# Patient Record
Sex: FEMALE | Race: WHITE | NOT HISPANIC OR LATINO | Employment: FULL TIME | URBAN - METROPOLITAN AREA
[De-identification: names, ages, dates, MRNs, and addresses within clinical notes are randomized per-mention and may not be internally consistent; named-entity substitution may affect disease eponyms.]

---

## 2017-01-05 ENCOUNTER — COMMUNICATION - HEALTHEAST (OUTPATIENT)
Dept: INTERNAL MEDICINE | Facility: CLINIC | Age: 52
End: 2017-01-05

## 2017-01-05 DIAGNOSIS — I10 HYPERTENSION: ICD-10-CM

## 2017-06-16 ENCOUNTER — COMMUNICATION - HEALTHEAST (OUTPATIENT)
Dept: INTERNAL MEDICINE | Facility: CLINIC | Age: 52
End: 2017-06-16

## 2017-06-16 DIAGNOSIS — E03.9 HYPOTHYROIDISM: ICD-10-CM

## 2017-06-16 DIAGNOSIS — I10 HYPERTENSION: ICD-10-CM

## 2017-07-12 ENCOUNTER — RECORDS - HEALTHEAST (OUTPATIENT)
Dept: MAMMOGRAPHY | Facility: CLINIC | Age: 52
End: 2017-07-12

## 2017-07-12 ENCOUNTER — AMBULATORY - HEALTHEAST (OUTPATIENT)
Dept: INTERNAL MEDICINE | Facility: CLINIC | Age: 52
End: 2017-07-12

## 2017-07-12 ENCOUNTER — OFFICE VISIT - HEALTHEAST (OUTPATIENT)
Dept: INTERNAL MEDICINE | Facility: CLINIC | Age: 52
End: 2017-07-12

## 2017-07-12 DIAGNOSIS — E03.9 ACQUIRED HYPOTHYROIDISM: ICD-10-CM

## 2017-07-12 DIAGNOSIS — G47.00 INSOMNIA: ICD-10-CM

## 2017-07-12 DIAGNOSIS — Z51.81 MEDICATION MONITORING ENCOUNTER: ICD-10-CM

## 2017-07-12 DIAGNOSIS — E11.9 TYPE 2 DIABETES MELLITUS (H): ICD-10-CM

## 2017-07-12 DIAGNOSIS — Z00.00 PREVENTATIVE HEALTH CARE: ICD-10-CM

## 2017-07-12 DIAGNOSIS — Z12.31 ENCOUNTER FOR SCREENING MAMMOGRAM FOR MALIGNANT NEOPLASM OF BREAST: ICD-10-CM

## 2017-07-12 LAB
CHOLEST SERPL-MCNC: 190 MG/DL
FASTING STATUS PATIENT QL REPORTED: YES
HBA1C MFR BLD: 6.8 % (ref 3.5–6)
HDLC SERPL-MCNC: 44 MG/DL
LDLC SERPL CALC-MCNC: 119 MG/DL
TRIGL SERPL-MCNC: 134 MG/DL

## 2017-07-12 RX ORDER — PYRIDOXINE HCL (VITAMIN B6) 100 MG
TABLET ORAL DAILY
Status: SHIPPED | COMMUNITY
Start: 2017-07-12

## 2017-07-12 ASSESSMENT — MIFFLIN-ST. JEOR: SCORE: 1882.39

## 2017-08-01 ENCOUNTER — RECORDS - HEALTHEAST (OUTPATIENT)
Dept: ADMINISTRATIVE | Facility: OTHER | Age: 52
End: 2017-08-01

## 2017-09-14 ENCOUNTER — COMMUNICATION - HEALTHEAST (OUTPATIENT)
Dept: INTERNAL MEDICINE | Facility: CLINIC | Age: 52
End: 2017-09-14

## 2017-09-14 DIAGNOSIS — E11.9 TYPE 2 DIABETES MELLITUS (H): ICD-10-CM

## 2017-09-20 ENCOUNTER — OFFICE VISIT - HEALTHEAST (OUTPATIENT)
Dept: INTERNAL MEDICINE | Facility: CLINIC | Age: 52
End: 2017-09-20

## 2017-09-20 DIAGNOSIS — Z00.00 PREVENTATIVE HEALTH CARE: ICD-10-CM

## 2017-09-20 DIAGNOSIS — E83.52 HYPERCALCEMIA: ICD-10-CM

## 2017-09-20 DIAGNOSIS — E03.9 ACQUIRED HYPOTHYROIDISM: ICD-10-CM

## 2017-09-20 DIAGNOSIS — E11.9 TYPE 2 DIABETES MELLITUS (H): ICD-10-CM

## 2017-09-20 RX ORDER — ALBUTEROL SULFATE 90 UG/1
2 AEROSOL, METERED RESPIRATORY (INHALATION) EVERY 6 HOURS PRN
Qty: 1 INHALER | Refills: 3 | Status: SHIPPED | OUTPATIENT
Start: 2017-09-20 | End: 2023-04-04

## 2017-09-20 ASSESSMENT — MIFFLIN-ST. JEOR: SCORE: 1905.07

## 2017-09-25 LAB
HPV INTERPRETATION - HISTORICAL: NORMAL
HPV INTERPRETER - HISTORICAL: NORMAL

## 2017-10-02 ENCOUNTER — COMMUNICATION - HEALTHEAST (OUTPATIENT)
Dept: INTERNAL MEDICINE | Facility: CLINIC | Age: 52
End: 2017-10-02

## 2017-10-02 LAB
BKR LAB AP ABNORMAL BLEEDING: NO
BKR LAB AP BIRTH CONTROL/HORMONES: NORMAL
BKR LAB AP CERVICAL APPEARANCE: NORMAL
BKR LAB AP GYN ADEQUACY: NORMAL
BKR LAB AP GYN INTERPRETATION: NORMAL
BKR LAB AP GYN OTHER FINDINGS: NORMAL
BKR LAB AP HPV REFLEX: NORMAL
BKR LAB AP LMP: NORMAL
BKR LAB AP PATIENT STATUS: NO
BKR LAB AP PREVIOUS ABNORMAL: NORMAL
BKR LAB AP PREVIOUS NORMAL: 2014
HIGH RISK?: NO
PATH REPORT.COMMENTS IMP SPEC: NORMAL
RESULT FLAG (HE HISTORICAL CONVERSION): NORMAL

## 2017-10-03 ENCOUNTER — COMMUNICATION - HEALTHEAST (OUTPATIENT)
Dept: INTERNAL MEDICINE | Facility: CLINIC | Age: 52
End: 2017-10-03

## 2017-10-06 ENCOUNTER — COMMUNICATION - HEALTHEAST (OUTPATIENT)
Dept: INTERNAL MEDICINE | Facility: CLINIC | Age: 52
End: 2017-10-06

## 2018-02-23 ENCOUNTER — COMMUNICATION - HEALTHEAST (OUTPATIENT)
Dept: INTERNAL MEDICINE | Facility: CLINIC | Age: 53
End: 2018-02-23

## 2018-02-23 DIAGNOSIS — E11.9 DIABETES (H): ICD-10-CM

## 2018-02-28 ENCOUNTER — COMMUNICATION - HEALTHEAST (OUTPATIENT)
Dept: INTERNAL MEDICINE | Facility: CLINIC | Age: 53
End: 2018-02-28

## 2018-02-28 DIAGNOSIS — E03.9 HYPOTHYROIDISM: ICD-10-CM

## 2018-02-28 DIAGNOSIS — E11.9 TYPE 2 DIABETES MELLITUS (H): ICD-10-CM

## 2018-05-08 ENCOUNTER — COMMUNICATION - HEALTHEAST (OUTPATIENT)
Dept: INTERNAL MEDICINE | Facility: CLINIC | Age: 53
End: 2018-05-08

## 2018-05-08 DIAGNOSIS — E11.9 DIABETES (H): ICD-10-CM

## 2018-05-29 ENCOUNTER — COMMUNICATION - HEALTHEAST (OUTPATIENT)
Dept: INTERNAL MEDICINE | Facility: CLINIC | Age: 53
End: 2018-05-29

## 2018-05-29 DIAGNOSIS — E11.9 TYPE 2 DIABETES MELLITUS (H): ICD-10-CM

## 2018-05-29 DIAGNOSIS — E03.9 HYPOTHYROIDISM: ICD-10-CM

## 2018-06-11 ENCOUNTER — COMMUNICATION - HEALTHEAST (OUTPATIENT)
Dept: INTERNAL MEDICINE | Facility: CLINIC | Age: 53
End: 2018-06-11

## 2018-06-11 DIAGNOSIS — I10 HYPERTENSION: ICD-10-CM

## 2018-06-11 DIAGNOSIS — G47.00 INSOMNIA: ICD-10-CM

## 2018-08-08 ENCOUNTER — COMMUNICATION - HEALTHEAST (OUTPATIENT)
Dept: INTERNAL MEDICINE | Facility: CLINIC | Age: 53
End: 2018-08-08

## 2018-08-08 DIAGNOSIS — E11.9 DIABETES (H): ICD-10-CM

## 2018-08-26 ENCOUNTER — COMMUNICATION - HEALTHEAST (OUTPATIENT)
Dept: INTERNAL MEDICINE | Facility: CLINIC | Age: 53
End: 2018-08-26

## 2018-08-26 DIAGNOSIS — E11.9 TYPE 2 DIABETES MELLITUS (H): ICD-10-CM

## 2018-08-26 DIAGNOSIS — E03.9 HYPOTHYROIDISM: ICD-10-CM

## 2018-08-28 ENCOUNTER — COMMUNICATION - HEALTHEAST (OUTPATIENT)
Dept: INTERNAL MEDICINE | Facility: CLINIC | Age: 53
End: 2018-08-28

## 2018-08-28 ENCOUNTER — OFFICE VISIT - HEALTHEAST (OUTPATIENT)
Dept: INTERNAL MEDICINE | Facility: CLINIC | Age: 53
End: 2018-08-28

## 2018-08-28 DIAGNOSIS — Z12.31 VISIT FOR SCREENING MAMMOGRAM: ICD-10-CM

## 2018-08-28 DIAGNOSIS — G47.00 INSOMNIA: ICD-10-CM

## 2018-08-28 DIAGNOSIS — E11.9 TYPE 2 DIABETES MELLITUS (H): ICD-10-CM

## 2018-08-28 DIAGNOSIS — E03.9 ACQUIRED HYPOTHYROIDISM: ICD-10-CM

## 2018-08-28 LAB
ALBUMIN SERPL-MCNC: 3.9 G/DL (ref 3.5–5)
ALP SERPL-CCNC: 83 U/L (ref 45–120)
ALT SERPL W P-5'-P-CCNC: 13 U/L (ref 0–45)
ANION GAP SERPL CALCULATED.3IONS-SCNC: 9 MMOL/L (ref 5–18)
AST SERPL W P-5'-P-CCNC: 15 U/L (ref 0–40)
BILIRUB SERPL-MCNC: 0.8 MG/DL (ref 0–1)
BUN SERPL-MCNC: 26 MG/DL (ref 8–22)
CALCIUM SERPL-MCNC: 10.5 MG/DL (ref 8.5–10.5)
CHLORIDE BLD-SCNC: 102 MMOL/L (ref 98–107)
CHOLEST SERPL-MCNC: 217 MG/DL
CO2 SERPL-SCNC: 29 MMOL/L (ref 22–31)
CREAT SERPL-MCNC: 1.26 MG/DL (ref 0.6–1.1)
ERYTHROCYTE [DISTWIDTH] IN BLOOD BY AUTOMATED COUNT: 13.1 % (ref 11–14.5)
FASTING STATUS PATIENT QL REPORTED: YES
GFR SERPL CREATININE-BSD FRML MDRD: 44 ML/MIN/1.73M2
GLUCOSE BLD-MCNC: 123 MG/DL (ref 70–125)
HBA1C MFR BLD: 7.5 % (ref 3.5–6)
HCT VFR BLD AUTO: 38.7 % (ref 35–47)
HDLC SERPL-MCNC: 45 MG/DL
HGB BLD-MCNC: 12.9 G/DL (ref 12–16)
LDLC SERPL CALC-MCNC: 139 MG/DL
MCH RBC QN AUTO: 28.9 PG (ref 27–34)
MCHC RBC AUTO-ENTMCNC: 33.3 G/DL (ref 32–36)
MCV RBC AUTO: 87 FL (ref 80–100)
PLATELET # BLD AUTO: 259 THOU/UL (ref 140–440)
PMV BLD AUTO: 8.5 FL (ref 7–10)
POTASSIUM BLD-SCNC: 4.8 MMOL/L (ref 3.5–5)
PROT SERPL-MCNC: 7.2 G/DL (ref 6–8)
PTH-INTACT SERPL-MCNC: 71 PG/ML (ref 10–86)
RBC # BLD AUTO: 4.46 MILL/UL (ref 3.8–5.4)
SODIUM SERPL-SCNC: 140 MMOL/L (ref 136–145)
T4 FREE SERPL-MCNC: 1.5 NG/DL (ref 0.7–1.8)
TRIGL SERPL-MCNC: 164 MG/DL
TSH SERPL DL<=0.005 MIU/L-ACNC: 6.05 UIU/ML (ref 0.3–5)
WBC: 8.1 THOU/UL (ref 4–11)

## 2018-09-09 ENCOUNTER — COMMUNICATION - HEALTHEAST (OUTPATIENT)
Dept: INTERNAL MEDICINE | Facility: CLINIC | Age: 53
End: 2018-09-09

## 2018-09-09 DIAGNOSIS — I10 HYPERTENSION: ICD-10-CM

## 2018-09-11 ENCOUNTER — RECORDS - HEALTHEAST (OUTPATIENT)
Dept: MAMMOGRAPHY | Facility: CLINIC | Age: 53
End: 2018-09-11

## 2018-09-11 DIAGNOSIS — Z12.31 ENCOUNTER FOR SCREENING MAMMOGRAM FOR MALIGNANT NEOPLASM OF BREAST: ICD-10-CM

## 2018-12-20 ENCOUNTER — COMMUNICATION - HEALTHEAST (OUTPATIENT)
Dept: INTERNAL MEDICINE | Facility: CLINIC | Age: 53
End: 2018-12-20

## 2018-12-27 ENCOUNTER — COMMUNICATION - HEALTHEAST (OUTPATIENT)
Dept: INTERNAL MEDICINE | Facility: CLINIC | Age: 53
End: 2018-12-27

## 2019-03-21 ENCOUNTER — OFFICE VISIT - HEALTHEAST (OUTPATIENT)
Dept: INTERNAL MEDICINE | Facility: CLINIC | Age: 54
End: 2019-03-21

## 2019-03-21 DIAGNOSIS — G43.909 MIGRAINES: ICD-10-CM

## 2019-03-21 DIAGNOSIS — E03.9 ACQUIRED HYPOTHYROIDISM: ICD-10-CM

## 2019-03-21 DIAGNOSIS — E66.01 MORBID OBESITY (H): ICD-10-CM

## 2019-03-21 DIAGNOSIS — E11.65 TYPE 2 DIABETES MELLITUS WITH HYPERGLYCEMIA, WITHOUT LONG-TERM CURRENT USE OF INSULIN (H): ICD-10-CM

## 2019-03-21 DIAGNOSIS — Z51.81 MEDICATION MONITORING ENCOUNTER: ICD-10-CM

## 2019-03-21 LAB
ALBUMIN SERPL-MCNC: 4 G/DL (ref 3.5–5)
ALP SERPL-CCNC: 87 U/L (ref 45–120)
ALT SERPL W P-5'-P-CCNC: 14 U/L (ref 0–45)
ANION GAP SERPL CALCULATED.3IONS-SCNC: 12 MMOL/L (ref 5–18)
AST SERPL W P-5'-P-CCNC: 12 U/L (ref 0–40)
BILIRUB SERPL-MCNC: 0.7 MG/DL (ref 0–1)
BUN SERPL-MCNC: 27 MG/DL (ref 8–22)
CALCIUM SERPL-MCNC: 10.3 MG/DL (ref 8.5–10.5)
CHLORIDE BLD-SCNC: 100 MMOL/L (ref 98–107)
CO2 SERPL-SCNC: 27 MMOL/L (ref 22–31)
CREAT SERPL-MCNC: 1.14 MG/DL (ref 0.6–1.1)
ERYTHROCYTE [DISTWIDTH] IN BLOOD BY AUTOMATED COUNT: 12.4 % (ref 11–14.5)
GFR SERPL CREATININE-BSD FRML MDRD: 50 ML/MIN/1.73M2
GLUCOSE BLD-MCNC: 135 MG/DL (ref 70–125)
HBA1C MFR BLD: 7.2 % (ref 3.5–6)
HCT VFR BLD AUTO: 38.7 % (ref 35–47)
HGB BLD-MCNC: 12.9 G/DL (ref 12–16)
LDLC SERPL CALC-MCNC: 91 MG/DL
MCH RBC QN AUTO: 27.8 PG (ref 27–34)
MCHC RBC AUTO-ENTMCNC: 33.3 G/DL (ref 32–36)
MCV RBC AUTO: 83 FL (ref 80–100)
PLATELET # BLD AUTO: 254 THOU/UL (ref 140–440)
PMV BLD AUTO: 8.7 FL (ref 7–10)
POTASSIUM BLD-SCNC: 3.8 MMOL/L (ref 3.5–5)
PROT SERPL-MCNC: 7.1 G/DL (ref 6–8)
RBC # BLD AUTO: 4.64 MILL/UL (ref 3.8–5.4)
SODIUM SERPL-SCNC: 139 MMOL/L (ref 136–145)
TSH SERPL DL<=0.005 MIU/L-ACNC: 1.93 UIU/ML (ref 0.3–5)
WBC: 6.8 THOU/UL (ref 4–11)

## 2019-03-21 ASSESSMENT — MIFFLIN-ST. JEOR: SCORE: 1886.93

## 2019-05-26 ENCOUNTER — COMMUNICATION - HEALTHEAST (OUTPATIENT)
Dept: INTERNAL MEDICINE | Facility: CLINIC | Age: 54
End: 2019-05-26

## 2019-05-26 DIAGNOSIS — E11.9 DIABETES (H): ICD-10-CM

## 2019-06-11 ENCOUNTER — COMMUNICATION - HEALTHEAST (OUTPATIENT)
Dept: INTERNAL MEDICINE | Facility: CLINIC | Age: 54
End: 2019-06-11

## 2019-06-20 ENCOUNTER — COMMUNICATION - HEALTHEAST (OUTPATIENT)
Dept: INTERNAL MEDICINE | Facility: CLINIC | Age: 54
End: 2019-06-20

## 2019-06-27 ENCOUNTER — COMMUNICATION - HEALTHEAST (OUTPATIENT)
Dept: INTERNAL MEDICINE | Facility: CLINIC | Age: 54
End: 2019-06-27

## 2019-06-27 DIAGNOSIS — E11.9 TYPE 2 DIABETES MELLITUS (H): ICD-10-CM

## 2019-07-09 ENCOUNTER — RECORDS - HEALTHEAST (OUTPATIENT)
Dept: ADMINISTRATIVE | Facility: OTHER | Age: 54
End: 2019-07-09

## 2019-08-05 ENCOUNTER — COMMUNICATION - HEALTHEAST (OUTPATIENT)
Dept: INTERNAL MEDICINE | Facility: CLINIC | Age: 54
End: 2019-08-05

## 2019-08-09 ENCOUNTER — COMMUNICATION - HEALTHEAST (OUTPATIENT)
Dept: INTERNAL MEDICINE | Facility: CLINIC | Age: 54
End: 2019-08-09

## 2019-08-12 ENCOUNTER — COMMUNICATION - HEALTHEAST (OUTPATIENT)
Dept: INTERNAL MEDICINE | Facility: CLINIC | Age: 54
End: 2019-08-12

## 2019-08-12 DIAGNOSIS — E03.9 HYPOTHYROIDISM: ICD-10-CM

## 2019-08-12 DIAGNOSIS — E11.9 TYPE 2 DIABETES MELLITUS (H): ICD-10-CM

## 2019-08-21 ENCOUNTER — COMMUNICATION - HEALTHEAST (OUTPATIENT)
Dept: INTERNAL MEDICINE | Facility: CLINIC | Age: 54
End: 2019-08-21

## 2019-08-21 ENCOUNTER — RECORDS - HEALTHEAST (OUTPATIENT)
Dept: HEALTH INFORMATION MANAGEMENT | Facility: CLINIC | Age: 54
End: 2019-08-21

## 2019-08-21 DIAGNOSIS — I10 HYPERTENSION: ICD-10-CM

## 2019-08-21 RX ORDER — LISINOPRIL 5 MG/1
TABLET ORAL
Qty: 90 TABLET | Refills: 0 | Status: SHIPPED
Start: 2019-08-21 | End: 2021-08-05

## 2019-08-25 ENCOUNTER — COMMUNICATION - HEALTHEAST (OUTPATIENT)
Dept: INTERNAL MEDICINE | Facility: CLINIC | Age: 54
End: 2019-08-25

## 2019-08-25 DIAGNOSIS — G47.00 INSOMNIA: ICD-10-CM

## 2019-09-08 ENCOUNTER — COMMUNICATION - HEALTHEAST (OUTPATIENT)
Dept: INTERNAL MEDICINE | Facility: CLINIC | Age: 54
End: 2019-09-08

## 2019-09-08 DIAGNOSIS — I10 HYPERTENSION: ICD-10-CM

## 2019-11-10 ENCOUNTER — COMMUNICATION - HEALTHEAST (OUTPATIENT)
Dept: INTERNAL MEDICINE | Facility: CLINIC | Age: 54
End: 2019-11-10

## 2019-11-10 DIAGNOSIS — E03.9 HYPOTHYROIDISM: ICD-10-CM

## 2019-11-11 ENCOUNTER — COMMUNICATION - HEALTHEAST (OUTPATIENT)
Dept: INTERNAL MEDICINE | Facility: CLINIC | Age: 54
End: 2019-11-11

## 2019-11-11 DIAGNOSIS — E11.9 TYPE 2 DIABETES MELLITUS (H): ICD-10-CM

## 2019-11-23 ENCOUNTER — COMMUNICATION - HEALTHEAST (OUTPATIENT)
Dept: INTERNAL MEDICINE | Facility: CLINIC | Age: 54
End: 2019-11-23

## 2019-11-23 DIAGNOSIS — E11.9 DIABETES (H): ICD-10-CM

## 2020-02-20 ENCOUNTER — COMMUNICATION - HEALTHEAST (OUTPATIENT)
Dept: INTERNAL MEDICINE | Facility: CLINIC | Age: 55
End: 2020-02-20

## 2020-02-20 DIAGNOSIS — G47.00 INSOMNIA: ICD-10-CM

## 2020-03-07 ENCOUNTER — COMMUNICATION - HEALTHEAST (OUTPATIENT)
Dept: INTERNAL MEDICINE | Facility: CLINIC | Age: 55
End: 2020-03-07

## 2020-03-07 DIAGNOSIS — G47.00 INSOMNIA: ICD-10-CM

## 2020-04-17 ENCOUNTER — COMMUNICATION - HEALTHEAST (OUTPATIENT)
Dept: INTERNAL MEDICINE | Facility: CLINIC | Age: 55
End: 2020-04-17

## 2020-04-17 DIAGNOSIS — G43.909 MIGRAINES: ICD-10-CM

## 2020-04-17 RX ORDER — SUMATRIPTAN 50 MG/1
TABLET, FILM COATED ORAL
Qty: 9 TABLET | Refills: 11 | Status: SHIPPED | OUTPATIENT
Start: 2020-04-17 | End: 2023-04-04

## 2020-04-22 ENCOUNTER — COMMUNICATION - HEALTHEAST (OUTPATIENT)
Dept: INTERNAL MEDICINE | Facility: CLINIC | Age: 55
End: 2020-04-22

## 2020-04-22 DIAGNOSIS — E11.9 TYPE 2 DIABETES MELLITUS (H): ICD-10-CM

## 2020-06-22 ENCOUNTER — COMMUNICATION - HEALTHEAST (OUTPATIENT)
Dept: INTERNAL MEDICINE | Facility: CLINIC | Age: 55
End: 2020-06-22

## 2020-06-22 DIAGNOSIS — G47.00 INSOMNIA: ICD-10-CM

## 2020-06-25 ENCOUNTER — COMMUNICATION - HEALTHEAST (OUTPATIENT)
Dept: INTERNAL MEDICINE | Facility: CLINIC | Age: 55
End: 2020-06-25

## 2020-06-25 DIAGNOSIS — I10 HYPERTENSION: ICD-10-CM

## 2020-07-16 ENCOUNTER — RECORDS - HEALTHEAST (OUTPATIENT)
Dept: ADMINISTRATIVE | Facility: OTHER | Age: 55
End: 2020-07-16

## 2020-07-16 LAB — RETINOPATHY: NEGATIVE

## 2020-07-28 ENCOUNTER — RECORDS - HEALTHEAST (OUTPATIENT)
Dept: HEALTH INFORMATION MANAGEMENT | Facility: CLINIC | Age: 55
End: 2020-07-28

## 2020-08-31 ENCOUNTER — COMMUNICATION - HEALTHEAST (OUTPATIENT)
Dept: INTERNAL MEDICINE | Facility: CLINIC | Age: 55
End: 2020-08-31

## 2020-08-31 DIAGNOSIS — G47.00 INSOMNIA: ICD-10-CM

## 2020-09-18 ENCOUNTER — COMMUNICATION - HEALTHEAST (OUTPATIENT)
Dept: INTERNAL MEDICINE | Facility: CLINIC | Age: 55
End: 2020-09-18

## 2020-09-18 DIAGNOSIS — E11.9 DIABETES (H): ICD-10-CM

## 2020-11-13 ENCOUNTER — COMMUNICATION - HEALTHEAST (OUTPATIENT)
Dept: INTERNAL MEDICINE | Facility: CLINIC | Age: 55
End: 2020-11-13

## 2020-11-13 DIAGNOSIS — G47.00 INSOMNIA: ICD-10-CM

## 2020-11-13 DIAGNOSIS — I10 HYPERTENSION: ICD-10-CM

## 2020-11-13 DIAGNOSIS — E11.9 TYPE 2 DIABETES MELLITUS (H): ICD-10-CM

## 2020-11-13 RX ORDER — TRAZODONE HYDROCHLORIDE 50 MG/1
50-100 TABLET, FILM COATED ORAL AT BEDTIME
Qty: 180 TABLET | Refills: 3 | Status: SHIPPED | OUTPATIENT
Start: 2020-11-13 | End: 2021-11-01

## 2020-11-13 RX ORDER — TRIAMTERENE AND HYDROCHLOROTHIAZIDE 37.5; 25 MG/1; MG/1
1 CAPSULE ORAL DAILY
Qty: 90 CAPSULE | Refills: 3 | Status: SHIPPED | OUTPATIENT
Start: 2020-11-13 | End: 2021-11-01

## 2020-11-13 RX ORDER — ATORVASTATIN CALCIUM 10 MG/1
TABLET, FILM COATED ORAL
Qty: 90 TABLET | Refills: 3 | Status: SHIPPED | OUTPATIENT
Start: 2020-11-13 | End: 2021-11-01

## 2020-11-18 ENCOUNTER — COMMUNICATION - HEALTHEAST (OUTPATIENT)
Dept: INTERNAL MEDICINE | Facility: CLINIC | Age: 55
End: 2020-11-18

## 2020-11-18 DIAGNOSIS — E03.9 HYPOTHYROIDISM: ICD-10-CM

## 2020-11-18 DIAGNOSIS — Z00.00 PREVENTATIVE HEALTH CARE: ICD-10-CM

## 2021-01-08 ENCOUNTER — OFFICE VISIT - HEALTHEAST (OUTPATIENT)
Dept: INTERNAL MEDICINE | Facility: CLINIC | Age: 56
End: 2021-01-08

## 2021-01-08 DIAGNOSIS — Z00.00 ENCOUNTER FOR PREVENTIVE CARE: ICD-10-CM

## 2021-01-08 DIAGNOSIS — Z12.11 SCREEN FOR COLON CANCER: ICD-10-CM

## 2021-01-08 DIAGNOSIS — Z12.31 VISIT FOR SCREENING MAMMOGRAM: ICD-10-CM

## 2021-01-08 DIAGNOSIS — G89.29 CHRONIC LEFT SHOULDER PAIN: ICD-10-CM

## 2021-01-08 DIAGNOSIS — E11.65 TYPE 2 DIABETES MELLITUS WITH HYPERGLYCEMIA, WITHOUT LONG-TERM CURRENT USE OF INSULIN (H): ICD-10-CM

## 2021-01-08 DIAGNOSIS — E03.9 ACQUIRED HYPOTHYROIDISM: ICD-10-CM

## 2021-01-08 DIAGNOSIS — E11.9 TYPE 2 DIABETES MELLITUS (H): ICD-10-CM

## 2021-01-08 DIAGNOSIS — E66.01 MORBID OBESITY (H): ICD-10-CM

## 2021-01-08 DIAGNOSIS — N18.31 STAGE 3A CHRONIC KIDNEY DISEASE (H): ICD-10-CM

## 2021-01-08 DIAGNOSIS — M25.512 CHRONIC LEFT SHOULDER PAIN: ICD-10-CM

## 2021-01-08 LAB
ALBUMIN SERPL-MCNC: 3.8 G/DL (ref 3.5–5)
ALP SERPL-CCNC: 98 U/L (ref 45–120)
ALT SERPL W P-5'-P-CCNC: 24 U/L (ref 0–45)
ANION GAP SERPL CALCULATED.3IONS-SCNC: 13 MMOL/L (ref 5–18)
AST SERPL W P-5'-P-CCNC: 14 U/L (ref 0–40)
BILIRUB SERPL-MCNC: 0.8 MG/DL (ref 0–1)
BUN SERPL-MCNC: 36 MG/DL (ref 8–22)
CALCIUM SERPL-MCNC: 10.2 MG/DL (ref 8.5–10.5)
CHLORIDE BLD-SCNC: 98 MMOL/L (ref 98–107)
CHOLEST SERPL-MCNC: 169 MG/DL
CO2 SERPL-SCNC: 29 MMOL/L (ref 22–31)
CREAT SERPL-MCNC: 1.31 MG/DL (ref 0.6–1.1)
CREAT UR-MCNC: 72.7 MG/DL
ERYTHROCYTE [DISTWIDTH] IN BLOOD BY AUTOMATED COUNT: 12.6 % (ref 11–14.5)
FASTING STATUS PATIENT QL REPORTED: YES
GFR SERPL CREATININE-BSD FRML MDRD: 42 ML/MIN/1.73M2
GLUCOSE BLD-MCNC: 277 MG/DL (ref 70–125)
HBA1C MFR BLD: 10.3 %
HCT VFR BLD AUTO: 39.6 % (ref 35–47)
HDLC SERPL-MCNC: 44 MG/DL
HGB BLD-MCNC: 13.6 G/DL (ref 12–16)
LDLC SERPL CALC-MCNC: 81 MG/DL
MCH RBC QN AUTO: 28.2 PG (ref 27–34)
MCHC RBC AUTO-ENTMCNC: 34.2 G/DL (ref 32–36)
MCV RBC AUTO: 83 FL (ref 80–100)
MICROALBUMIN UR-MCNC: 1.28 MG/DL (ref 0–1.99)
MICROALBUMIN/CREAT UR: 17.6 MG/G
PLATELET # BLD AUTO: 233 THOU/UL (ref 140–440)
PMV BLD AUTO: 8.7 FL (ref 7–10)
POTASSIUM BLD-SCNC: 4.4 MMOL/L (ref 3.5–5)
PROT SERPL-MCNC: 7 G/DL (ref 6–8)
RBC # BLD AUTO: 4.8 MILL/UL (ref 3.8–5.4)
SODIUM SERPL-SCNC: 140 MMOL/L (ref 136–145)
TRIGL SERPL-MCNC: 218 MG/DL
TSH SERPL DL<=0.005 MIU/L-ACNC: 0.49 UIU/ML (ref 0.3–5)
WBC: 9.3 THOU/UL (ref 4–11)

## 2021-01-08 ASSESSMENT — MIFFLIN-ST. JEOR: SCORE: 1886.46

## 2021-01-11 LAB — HCV AB SERPL QL IA: NEGATIVE

## 2021-01-19 ENCOUNTER — HOSPITAL ENCOUNTER (OUTPATIENT)
Dept: MRI IMAGING | Facility: HOSPITAL | Age: 56
Discharge: HOME | End: 2021-01-19
Attending: INTERNAL MEDICINE

## 2021-01-19 DIAGNOSIS — G89.29 CHRONIC LEFT SHOULDER PAIN: ICD-10-CM

## 2021-01-19 DIAGNOSIS — M25.512 CHRONIC LEFT SHOULDER PAIN: ICD-10-CM

## 2021-01-21 ENCOUNTER — RECORDS - HEALTHEAST (OUTPATIENT)
Dept: ADMINISTRATIVE | Facility: OTHER | Age: 56
End: 2021-01-21

## 2021-01-21 ENCOUNTER — TRANSFERRED RECORDS (OUTPATIENT)
Dept: HEALTH INFORMATION MANAGEMENT | Facility: CLINIC | Age: 56
End: 2021-01-21

## 2021-01-24 ENCOUNTER — COMMUNICATION - HEALTHEAST (OUTPATIENT)
Dept: INTERNAL MEDICINE | Facility: CLINIC | Age: 56
End: 2021-01-24

## 2021-01-24 DIAGNOSIS — E11.9 TYPE 2 DIABETES MELLITUS (H): ICD-10-CM

## 2021-01-25 RX ORDER — GLIMEPIRIDE 2 MG/1
4 TABLET ORAL DAILY
Qty: 180 TABLET | Refills: 3 | Status: SHIPPED | OUTPATIENT
Start: 2021-01-25 | End: 2022-01-10

## 2021-01-27 ENCOUNTER — HOSPITAL ENCOUNTER (OUTPATIENT)
Dept: MRI IMAGING | Facility: HOSPITAL | Age: 56
Discharge: HOME | End: 2021-01-27
Attending: INTERNAL MEDICINE

## 2021-01-28 ENCOUNTER — COMMUNICATION - HEALTHEAST (OUTPATIENT)
Dept: INTERNAL MEDICINE | Facility: CLINIC | Age: 56
End: 2021-01-28

## 2021-02-02 ENCOUNTER — COMMUNICATION - HEALTHEAST (OUTPATIENT)
Dept: INTERNAL MEDICINE | Facility: CLINIC | Age: 56
End: 2021-02-02

## 2021-02-02 DIAGNOSIS — E11.65 TYPE 2 DIABETES MELLITUS WITH HYPERGLYCEMIA, WITHOUT LONG-TERM CURRENT USE OF INSULIN (H): ICD-10-CM

## 2021-02-02 DIAGNOSIS — E03.9 HYPOTHYROIDISM: ICD-10-CM

## 2021-02-03 RX ORDER — LEVOTHYROXINE SODIUM 75 UG/1
TABLET ORAL
Qty: 90 TABLET | Refills: 3 | Status: SHIPPED | OUTPATIENT
Start: 2021-02-03 | End: 2021-09-07

## 2021-02-04 ENCOUNTER — COMMUNICATION - HEALTHEAST (OUTPATIENT)
Dept: INTERNAL MEDICINE | Facility: CLINIC | Age: 56
End: 2021-02-04

## 2021-02-04 DIAGNOSIS — E03.9 HYPOTHYROIDISM: ICD-10-CM

## 2021-02-09 ENCOUNTER — COMMUNICATION - HEALTHEAST (OUTPATIENT)
Dept: INTERNAL MEDICINE | Facility: CLINIC | Age: 56
End: 2021-02-09

## 2021-02-19 ENCOUNTER — COMMUNICATION - HEALTHEAST (OUTPATIENT)
Dept: INTERNAL MEDICINE | Facility: CLINIC | Age: 56
End: 2021-02-19

## 2021-02-26 ENCOUNTER — OFFICE VISIT - HEALTHEAST (OUTPATIENT)
Dept: PHYSICAL THERAPY | Facility: REHABILITATION | Age: 56
End: 2021-02-26

## 2021-02-26 DIAGNOSIS — R29.898 SHOULDER WEAKNESS: ICD-10-CM

## 2021-02-26 DIAGNOSIS — M25.612 DECREASED RANGE OF MOTION OF SHOULDER, LEFT: ICD-10-CM

## 2021-03-04 ENCOUNTER — AMBULATORY - HEALTHEAST (OUTPATIENT)
Dept: OTHER | Facility: CLINIC | Age: 56
End: 2021-03-04

## 2021-03-12 ENCOUNTER — RECORDS - HEALTHEAST (OUTPATIENT)
Dept: MAMMOGRAPHY | Facility: CLINIC | Age: 56
End: 2021-03-12

## 2021-03-12 ENCOUNTER — OFFICE VISIT - HEALTHEAST (OUTPATIENT)
Dept: INTERNAL MEDICINE | Facility: CLINIC | Age: 56
End: 2021-03-12

## 2021-03-12 ENCOUNTER — COMMUNICATION - HEALTHEAST (OUTPATIENT)
Dept: INTERNAL MEDICINE | Facility: CLINIC | Age: 56
End: 2021-03-12

## 2021-03-12 DIAGNOSIS — N18.31 STAGE 3A CHRONIC KIDNEY DISEASE (H): ICD-10-CM

## 2021-03-12 DIAGNOSIS — Z12.31 ENCOUNTER FOR SCREENING MAMMOGRAM FOR MALIGNANT NEOPLASM OF BREAST: ICD-10-CM

## 2021-03-12 DIAGNOSIS — E11.65 TYPE 2 DIABETES MELLITUS WITH HYPERGLYCEMIA, WITHOUT LONG-TERM CURRENT USE OF INSULIN (H): ICD-10-CM

## 2021-03-12 DIAGNOSIS — E66.01 MORBID OBESITY (H): ICD-10-CM

## 2021-03-12 DIAGNOSIS — Z12.11 SCREENING FOR COLON CANCER: ICD-10-CM

## 2021-03-12 LAB
ANION GAP SERPL CALCULATED.3IONS-SCNC: 11 MMOL/L (ref 5–18)
BUN SERPL-MCNC: 25 MG/DL (ref 8–22)
CALCIUM SERPL-MCNC: 9.9 MG/DL (ref 8.5–10.5)
CHLORIDE BLD-SCNC: 102 MMOL/L (ref 98–107)
CO2 SERPL-SCNC: 27 MMOL/L (ref 22–31)
CREAT SERPL-MCNC: 0.95 MG/DL (ref 0.6–1.1)
GFR SERPL CREATININE-BSD FRML MDRD: >60 ML/MIN/1.73M2
GLUCOSE BLD-MCNC: 143 MG/DL (ref 70–125)
HBA1C MFR BLD: 8.6 %
POTASSIUM BLD-SCNC: 4.6 MMOL/L (ref 3.5–5)
SODIUM SERPL-SCNC: 140 MMOL/L (ref 136–145)

## 2021-03-12 ASSESSMENT — MIFFLIN-ST. JEOR: SCORE: 1879.66

## 2021-03-19 ENCOUNTER — OFFICE VISIT - HEALTHEAST (OUTPATIENT)
Dept: PHYSICAL THERAPY | Facility: REHABILITATION | Age: 56
End: 2021-03-19

## 2021-03-19 DIAGNOSIS — R29.898 SHOULDER WEAKNESS: ICD-10-CM

## 2021-03-19 DIAGNOSIS — M25.612 DECREASED RANGE OF MOTION OF SHOULDER, LEFT: ICD-10-CM

## 2021-03-25 ENCOUNTER — AMBULATORY - HEALTHEAST (OUTPATIENT)
Dept: NURSING | Facility: CLINIC | Age: 56
End: 2021-03-25

## 2021-04-02 ENCOUNTER — OFFICE VISIT - HEALTHEAST (OUTPATIENT)
Dept: PHYSICAL THERAPY | Facility: REHABILITATION | Age: 56
End: 2021-04-02

## 2021-04-02 DIAGNOSIS — M25.612 DECREASED RANGE OF MOTION OF SHOULDER, LEFT: ICD-10-CM

## 2021-04-02 DIAGNOSIS — R29.898 SHOULDER WEAKNESS: ICD-10-CM

## 2021-04-04 ENCOUNTER — COMMUNICATION - HEALTHEAST (OUTPATIENT)
Dept: INTERNAL MEDICINE | Facility: CLINIC | Age: 56
End: 2021-04-04

## 2021-04-04 DIAGNOSIS — E03.9 HYPOTHYROIDISM: ICD-10-CM

## 2021-04-05 RX ORDER — LEVOTHYROXINE SODIUM 200 UG/1
TABLET ORAL
Qty: 90 TABLET | Refills: 3 | Status: SHIPPED | OUTPATIENT
Start: 2021-04-05 | End: 2022-05-12

## 2021-04-09 ENCOUNTER — OFFICE VISIT - HEALTHEAST (OUTPATIENT)
Dept: PHYSICAL THERAPY | Facility: REHABILITATION | Age: 56
End: 2021-04-09

## 2021-04-09 DIAGNOSIS — R29.898 SHOULDER WEAKNESS: ICD-10-CM

## 2021-04-09 DIAGNOSIS — M25.612 DECREASED RANGE OF MOTION OF SHOULDER, LEFT: ICD-10-CM

## 2021-04-15 ENCOUNTER — AMBULATORY - HEALTHEAST (OUTPATIENT)
Dept: NURSING | Facility: CLINIC | Age: 56
End: 2021-04-15

## 2021-04-16 ENCOUNTER — OFFICE VISIT - HEALTHEAST (OUTPATIENT)
Dept: PHYSICAL THERAPY | Facility: REHABILITATION | Age: 56
End: 2021-04-16

## 2021-04-16 DIAGNOSIS — M25.612 DECREASED RANGE OF MOTION OF SHOULDER, LEFT: ICD-10-CM

## 2021-04-16 DIAGNOSIS — R29.898 SHOULDER WEAKNESS: ICD-10-CM

## 2021-04-23 ENCOUNTER — OFFICE VISIT - HEALTHEAST (OUTPATIENT)
Dept: PHYSICAL THERAPY | Facility: REHABILITATION | Age: 56
End: 2021-04-23

## 2021-04-23 DIAGNOSIS — M25.612 DECREASED RANGE OF MOTION OF SHOULDER, LEFT: ICD-10-CM

## 2021-04-23 DIAGNOSIS — R29.898 SHOULDER WEAKNESS: ICD-10-CM

## 2021-05-27 ENCOUNTER — RECORDS - HEALTHEAST (OUTPATIENT)
Dept: ADMINISTRATIVE | Facility: CLINIC | Age: 56
End: 2021-05-27

## 2021-05-28 ASSESSMENT — ASTHMA QUESTIONNAIRES: ACT_TOTALSCORE: 25

## 2021-05-29 NOTE — TELEPHONE ENCOUNTER
RN cannot approve Refill Request    RN can NOT refill this medication PCP messaged that patient is overdue for Labs. Last office visit: 3/21/2019 Candis Vee MD Last Physical: 9/20/2017 Last MTM visit: Visit date not found Last visit same specialty: 3/21/2019 Candis Vee MD.  Next visit within 3 mo: Visit date not found  Next physical within 3 mo: Visit date not found      Elier Sharma, [facility] Triage/Med Refill 5/26/2019    Requested Prescriptions   Pending Prescriptions Disp Refills     metFORMIN (GLUCOPHAGE) 1000 MG tablet [Pharmacy Med Name: METFORMIN HCL TABS 1000MG] 180 tablet 1     Sig: TAKE 1 TABLET TWICE A DAY WITH MEALS       Metformin Refill Protocol Failed - 5/26/2019  6:39 AM        Failed - Microalbumin in last year      Microalbumin, Random Urine   Date Value Ref Range Status   08/31/2012 10 1 - 20 mg/L Final                  Passed - Blood pressure in last 12 months     BP Readings from Last 1 Encounters:   03/21/19 130/80             Passed - LFT or AST or ALT in last 12 months     Albumin   Date Value Ref Range Status   03/21/2019 4.0 3.5 - 5.0 g/dL Final     Bilirubin, Total   Date Value Ref Range Status   03/21/2019 0.7 0.0 - 1.0 mg/dL Final     Bilirubin, Direct   Date Value Ref Range Status   05/15/2013 0.22 <0.31 mg/dL Final     Alkaline Phosphatase   Date Value Ref Range Status   03/21/2019 87 45 - 120 U/L Final     AST   Date Value Ref Range Status   03/21/2019 12 0 - 40 U/L Final     ALT   Date Value Ref Range Status   03/21/2019 14 0 - 45 U/L Final     Protein, Total   Date Value Ref Range Status   03/21/2019 7.1 6.0 - 8.0 g/dL Final                Passed - GFR or Serum Creatinine in last 6 months     GFR MDRD Non Af Amer   Date Value Ref Range Status   03/21/2019 50 (L) >60 mL/min/1.73m2 Final     GFR MDRD Af Amer   Date Value Ref Range Status   03/21/2019 60 (L) >60 mL/min/1.73m2 Final             Passed - Visit with PCP or prescribing provider visit in last 6  months or next 3 months     Last office visit with prescriber/PCP: 3/21/2019 OR same dept: 3/21/2019 Candis Vee MD OR same specialty: 3/21/2019 Candis Vee MD Last physical: Visit date not found Last MTM visit: Visit date not found         Next appt within 3 mo: Visit date not found  Next physical within 3 mo: Visit date not found  Prescriber OR PCP: Candis Vee MD  Last diagnosis associated with med order: 1. Diabetes (H)  - metFORMIN (GLUCOPHAGE) 1000 MG tablet [Pharmacy Med Name: METFORMIN HCL TABS 1000MG]; TAKE 1 TABLET TWICE A DAY WITH MEALS  Dispense: 180 tablet; Refill: 1     If protocol passes may refill for 12 months if within 3 months of last provider visit (or a total of 15 months).           Passed - A1C in last 6 months     Hemoglobin A1c   Date Value Ref Range Status   03/21/2019 7.2 (H) 3.5 - 6.0 % Final

## 2021-05-30 NOTE — TELEPHONE ENCOUNTER
Please call pt to schedule DM & Microalbumin follow up with PCP   And if pt would like to have labs done before hand please send back and I Will place lab orders thank you!

## 2021-05-31 VITALS — HEIGHT: 64 IN | BODY MASS INDEX: 50.02 KG/M2 | WEIGHT: 293 LBS

## 2021-05-31 VITALS — WEIGHT: 288 LBS | HEIGHT: 64 IN | BODY MASS INDEX: 49.17 KG/M2

## 2021-05-31 NOTE — TELEPHONE ENCOUNTER
Refill Approved    Rx renewed per Medication Renewal Policy. Medication was last renewed on :  Atorvastatin on 8/28/2018 for 90/3;  Synthroid on 8/28/2018 for 90/3  Last OV 3/21/2019  Tracy Calvillo, [facility] Triage/Med Refill 8/14/2019     Requested Prescriptions   Pending Prescriptions Disp Refills     atorvastatin (LIPITOR) 10 MG tablet [Pharmacy Med Name: ATORVASTATIN TABS 10MG] 90 tablet 3     Sig: TAKE 1 TABLET DAILY       Statins Refill Protocol (Hmg CoA Reductase Inhibitors) Passed - 8/12/2019 11:24 PM        Passed - PCP or prescribing provider visit in past 12 months      Last office visit with prescriber/PCP: 3/21/2019 Candis Vee MD OR same dept: 3/21/2019 Candis Vee MD OR same specialty: 3/21/2019 Candis Vee MD  Last physical: 9/20/2017 Last MTM visit: Visit date not found   Next visit within 3 mo: Visit date not found  Next physical within 3 mo: Visit date not found  Prescriber OR PCP: Candis Vee MD  Last diagnosis associated with med order: There are no diagnoses linked to this encounter.  If protocol passes may refill for 12 months if within 3 months of last provider visit (or a total of 15 months).             levothyroxine (SYNTHROID, LEVOTHROID) 75 MCG tablet [Pharmacy Med Name: L-THYROXINE (SYNTHROID) TABS 75MCG] 90 tablet 3     Sig: TAKE 1 TABLET DAILY IN ADDITION TO  MCG TABLET.       Thyroid Hormones Protocol Passed - 8/12/2019 11:24 PM        Passed - Provider visit in past 12 months or next 3 months     Last office visit with prescriber/PCP: 3/21/2019 Candis Vee MD OR same dept: 3/21/2019 Candis Vee MD OR same specialty: 3/21/2019 Candis Vee MD  Last physical: 9/20/2017 Last MTM visit: Visit date not found   Next visit within 3 mo: Visit date not found  Next physical within 3 mo: Visit date not found  Prescriber OR PCP: Candis Vee MD  Last diagnosis associated with med order: There are no diagnoses linked to  this encounter.  If protocol passes may refill for 12 months if within 3 months of last provider visit (or a total of 15 months).             Passed - TSH on file in past 12 months for patient age 12 & older     TSH   Date Value Ref Range Status   03/21/2019 1.93 0.30 - 5.00 uIU/mL Final

## 2021-05-31 NOTE — TELEPHONE ENCOUNTER
Refill Approved    Rx renewed per Medication Renewal Policy. Medication was last renewed on 8/28/18.    Regla Ramirez, [facility] Connection Triage/Med Refill 8/25/2019     Requested Prescriptions   Pending Prescriptions Disp Refills     traZODone (DESYREL) 50 MG tablet 180 tablet 3     Sig: Take 1-2 tabs at bedtime       There is no refill protocol information for this order      Refused Prescriptions Disp Refills     traZODone (DESYREL) 50 MG tablet [Pharmacy Med Name: TRAZODONE HCL TABS 50MG] 180 tablet 4     Sig: TAKE 1 TO 2 TABLETS AT BEDTIME       Tricyclics/Misc Antidepressant/Antianxiety Meds Refill Protocol Passed - 8/25/2019  6:45 AM        Passed - PCP or prescribing provider visit in last year     Last office visit with prescriber/PCP: 3/21/2019 Candis Vee MD OR same dept: 3/21/2019 Candis Vee MD OR same specialty: 3/21/2019 Candis Vee MD  Last physical: 9/20/2017 Last MTM visit: Visit date not found   Next visit within 3 mo: Visit date not found  Next physical within 3 mo: Visit date not found  Prescriber OR PCP: Candis Vee MD  Last diagnosis associated with med order: 1. Insomnia  - traZODone (DESYREL) 50 MG tablet [Pharmacy Med Name: TRAZODONE HCL TABS 50MG]; TAKE 1 TO 2 TABLETS AT BEDTIME  Dispense: 180 tablet; Refill: 4  - traZODone (DESYREL) 50 MG tablet; Take 1-2 tabs at bedtime  Dispense: 180 tablet; Refill: 3    If protocol passes may refill for 12 months if within 3 months of last provider visit (or a total of 15 months).

## 2021-06-01 NOTE — TELEPHONE ENCOUNTER
Refill Approved    Rx renewed per Medication Renewal Policy. Medication was last renewed on 9/10/18.    Charissa Ray, [facility] Connection Triage/Med Refill 9/8/2019     Requested Prescriptions   Pending Prescriptions Disp Refills     triamterene-hydrochlorothiazide (DYAZIDE) 37.5-25 mg per capsule [Pharmacy Med Name: TRIAMTERENE/HCTZ CAPS 37.5/25] 90 capsule 4     Sig: TAKE 1 CAPSULE DAILY       Diuretics/Combination Diuretics Refill Protocol  Passed - 9/8/2019  6:26 AM        Passed - Visit with PCP or prescribing provider visit in past 12 months     Last office visit with prescriber/PCP: 3/21/2019 Candis Vee MD OR same dept: 3/21/2019 Candis Vee MD OR same specialty: 3/21/2019 Candis Vee MD  Last physical: 9/20/2017 Last MTM visit: Visit date not found   Next visit within 3 mo: Visit date not found  Next physical within 3 mo: Visit date not found  Prescriber OR PCP: Candis Vee MD  Last diagnosis associated with med order: 1. Hypertension  - triamterene-hydrochlorothiazide (DYAZIDE) 37.5-25 mg per capsule [Pharmacy Med Name: TRIAMTERENE/HCTZ CAPS 37.5/25]; TAKE 1 CAPSULE DAILY  Dispense: 90 capsule; Refill: 4    If protocol passes may refill for 12 months if within 3 months of last provider visit (or a total of 15 months).             Passed - Serum Potassium in past 12 months      Lab Results   Component Value Date    Potassium 3.8 03/21/2019             Passed - Serum Sodium in past 12 months      Lab Results   Component Value Date    Sodium 139 03/21/2019             Passed - Blood pressure on file in past 12 months     BP Readings from Last 1 Encounters:   03/21/19 130/80             Passed - Serum Creatinine in past 12 months      Creatinine   Date Value Ref Range Status   03/21/2019 1.14 (H) 0.60 - 1.10 mg/dL Final

## 2021-06-02 VITALS — BODY MASS INDEX: 49.34 KG/M2 | HEIGHT: 64 IN | WEIGHT: 289 LBS

## 2021-06-03 NOTE — TELEPHONE ENCOUNTER
Refill Approved    Rx renewed per Medication Renewal Policy. Medication was last renewed on 8/14/2019.  Last office visit: 3/21/2019 with PCP Dr MISA Henning, [facility] Triage/Med Refill 11/12/2019     Requested Prescriptions   Pending Prescriptions Disp Refills     atorvastatin (LIPITOR) 10 MG tablet [Pharmacy Med Name: ATORVASTATIN TABS 10MG] 90 tablet 4     Sig: TAKE 1 TABLET DAILY. OVERDUE FOR DIABETES FOLLOW UP VISIT, CALL 24/7       Statins Refill Protocol (Hmg CoA Reductase Inhibitors) Passed - 11/11/2019 11:29 PM        Passed - PCP or prescribing provider visit in past 12 months      Last office visit with prescriber/PCP: 3/21/2019 Candis Vee MD OR same dept: 3/21/2019 Candis Vee MD OR same specialty: 3/21/2019 Candis Vee MD  Last physical: 9/20/2017 Last MTM visit: Visit date not found   Next visit within 3 mo: Visit date not found  Next physical within 3 mo: Visit date not found  Prescriber OR PCP: Candis Vee MD  Last diagnosis associated with med order: 1. Type 2 diabetes mellitus (H)  - atorvastatin (LIPITOR) 10 MG tablet [Pharmacy Med Name: ATORVASTATIN TABS 10MG]; TAKE 1 TABLET DAILY. OVERDUE FOR DIABETES FOLLOW UP VISIT, CALL 24/7  Dispense: 90 tablet; Refill: 4    If protocol passes may refill for 12 months if within 3 months of last provider visit (or a total of 15 months).

## 2021-06-03 NOTE — TELEPHONE ENCOUNTER
Refill Approved    Rx renewed per Medication Renewal Policy. Medication was last renewed on 8/14/19.    Shirley RONALD Silas, [facility] Triage/Med Refill 11/11/2019     Requested Prescriptions   Pending Prescriptions Disp Refills     levothyroxine (SYNTHROID, LEVOTHROID) 75 MCG tablet [Pharmacy Med Name: L-THYROXINE (SYNTHROID) TABS 75MCG] 90 tablet 4     Sig: TAKE 1 TABLET DAILY IN ADDITION TO  MCG TABLET.       Thyroid Hormones Protocol Passed - 11/10/2019 11:26 PM        Passed - Provider visit in past 12 months or next 3 months     Last office visit with prescriber/PCP: 3/21/2019 Candis Vee MD OR same dept: 3/21/2019 Candis Vee MD OR same specialty: 3/21/2019 Candis Vee MD  Last physical: 9/20/2017 Last MTM visit: Visit date not found   Next visit within 3 mo: Visit date not found  Next physical within 3 mo: Visit date not found  Prescriber OR PCP: Candis Vee MD  Last diagnosis associated with med order: 1. Hypothyroidism  - levothyroxine (SYNTHROID, LEVOTHROID) 75 MCG tablet [Pharmacy Med Name: L-THYROXINE (SYNTHROID) TABS 75MCG]; TAKE 1 TABLET DAILY IN ADDITION TO  MCG TABLET.  Dispense: 90 tablet; Refill: 4    If protocol passes may refill for 12 months if within 3 months of last provider visit (or a total of 15 months).             Passed - TSH on file in past 12 months for patient age 12 & older     TSH   Date Value Ref Range Status   03/21/2019 1.93 0.30 - 5.00 uIU/mL Final

## 2021-06-03 NOTE — TELEPHONE ENCOUNTER
RN cannot approve Refill Request    RN can NOT refill this medication Protocol failed and NO refill given.    Gemma Mcgrath, [facility] Triage/Med Refill 11/23/2019    Requested Prescriptions   Pending Prescriptions Disp Refills     metFORMIN (GLUCOPHAGE) 1000 MG tablet [Pharmacy Med Name: METFORMIN HCL TABS 1000MG] 180 tablet 4     Sig: TAKE 1 TABLET TWICE A DAY WITH MEALS       Metformin Refill Protocol Failed - 11/23/2019 12:03 AM        Failed - Visit with PCP or prescribing provider visit in last 6 months or next 3 months     Last office visit with prescriber/PCP: Visit date not found OR same dept: 3/21/2019 Candis Vee MD OR same specialty: 3/21/2019 Candis Vee MD Last physical: Visit date not found Last MTM visit: Visit date not found         Next appt within 3 mo: Visit date not found  Next physical within 3 mo: Visit date not found  Prescriber OR PCP: Candis Vee MD  Last diagnosis associated with med order: 1. Diabetes (H)  - metFORMIN (GLUCOPHAGE) 1000 MG tablet [Pharmacy Med Name: METFORMIN HCL TABS 1000MG]; TAKE 1 TABLET TWICE A DAY WITH MEALS  Dispense: 180 tablet; Refill: 4     If protocol passes may refill for 12 months if within 3 months of last provider visit (or a total of 15 months).           Failed - A1C in last 6 months     Hemoglobin A1c   Date Value Ref Range Status   03/21/2019 7.2 (H) 3.5 - 6.0 % Final               Failed - Microalbumin in last year      Microalbumin, Random Urine   Date Value Ref Range Status   08/31/2012 10 1 - 20 mg/L Final                  Passed - Blood pressure in last 12 months     BP Readings from Last 1 Encounters:   03/21/19 130/80             Passed - LFT or AST or ALT in last 12 months     Albumin   Date Value Ref Range Status   03/21/2019 4.0 3.5 - 5.0 g/dL Final     Bilirubin, Total   Date Value Ref Range Status   03/21/2019 0.7 0.0 - 1.0 mg/dL Final     Bilirubin, Direct   Date Value Ref Range Status   05/15/2013 0.22 <0.31  mg/dL Final     Alkaline Phosphatase   Date Value Ref Range Status   03/21/2019 87 45 - 120 U/L Final     AST   Date Value Ref Range Status   03/21/2019 12 0 - 40 U/L Final     ALT   Date Value Ref Range Status   03/21/2019 14 0 - 45 U/L Final     Protein, Total   Date Value Ref Range Status   03/21/2019 7.1 6.0 - 8.0 g/dL Final                Passed - GFR or Serum Creatinine in last 6 months     GFR MDRD Non Af Amer   Date Value Ref Range Status   03/21/2019 50 (L) >60 mL/min/1.73m2 Final     GFR MDRD Af Amer   Date Value Ref Range Status   03/21/2019 60 (L) >60 mL/min/1.73m2 Final

## 2021-06-05 VITALS
HEART RATE: 100 BPM | BODY MASS INDEX: 49.25 KG/M2 | DIASTOLIC BLOOD PRESSURE: 88 MMHG | OXYGEN SATURATION: 98 % | SYSTOLIC BLOOD PRESSURE: 130 MMHG | HEIGHT: 64 IN | WEIGHT: 288.5 LBS

## 2021-06-05 VITALS
OXYGEN SATURATION: 98 % | WEIGHT: 290 LBS | BODY MASS INDEX: 49.51 KG/M2 | DIASTOLIC BLOOD PRESSURE: 74 MMHG | HEIGHT: 64 IN | HEART RATE: 97 BPM | SYSTOLIC BLOOD PRESSURE: 132 MMHG

## 2021-06-06 NOTE — TELEPHONE ENCOUNTER
Refill Approved    Rx renewed per Medication Renewal Policy. Medication was last renewed on 8/25/19.    Paz Guerrero, [facility] Triage/Med Refill 2/21/2020     Requested Prescriptions   Pending Prescriptions Disp Refills     traZODone (DESYREL) 50 MG tablet 180 tablet 1     Sig: Take 1-2 tabs at bedtime       Tricyclics/Misc Antidepressant/Antianxiety Meds Refill Protocol Passed - 2/20/2020 10:07 AM        Passed - PCP or prescribing provider visit in last year     Last office visit with prescriber/PCP: 3/21/2019 Candis Vee MD OR same dept: 3/21/2019 Candis Vee MD OR same specialty: 3/21/2019 Candis Vee MD  Last physical: 9/20/2017 Last MTM visit: Visit date not found   Next visit within 3 mo: Visit date not found  Next physical within 3 mo: Visit date not found  Prescriber OR PCP: Candis Vee MD  Last diagnosis associated with med order: 1. Insomnia  - traZODone (DESYREL) 50 MG tablet; Take 1-2 tabs at bedtime  Dispense: 180 tablet; Refill: 1    If protocol passes may refill for 12 months if within 3 months of last provider visit (or a total of 15 months).

## 2021-06-06 NOTE — TELEPHONE ENCOUNTER
Refill Approved    Rx renewed per Medication Renewal Policy. Medication was last renewed on 2/21/10.    Charissa Ray, [facility] Connection Triage/Med Refill 3/10/2020     Requested Prescriptions   Pending Prescriptions Disp Refills     traZODone (DESYREL) 50 MG tablet [Pharmacy Med Name: TRAZODONE  50MG  TAB] 180 tablet 0     Sig: TAKE 1 TO 2 TABLETS BY  MOUTH AT BEDTIME       Tricyclics/Misc Antidepressant/Antianxiety Meds Refill Protocol Passed - 3/7/2020  1:06 PM        Passed - PCP or prescribing provider visit in last year     Last office visit with prescriber/PCP: 3/21/2019 Candis Vee MD OR same dept: 3/21/2019 Candis Vee MD OR same specialty: 3/21/2019 Candis Vee MD  Last physical: 9/20/2017 Last MTM visit: Visit date not found   Next visit within 3 mo: Visit date not found  Next physical within 3 mo: Visit date not found  Prescriber OR PCP: Candis Vee MD  Last diagnosis associated with med order: 1. Insomnia  - traZODone (DESYREL) 50 MG tablet [Pharmacy Med Name: TRAZODONE  50MG  TAB]; TAKE 1 TO 2 TABLETS BY  MOUTH AT BEDTIME  Dispense: 180 tablet; Refill: 0    If protocol passes may refill for 12 months if within 3 months of last provider visit (or a total of 15 months).

## 2021-06-06 NOTE — TELEPHONE ENCOUNTER
FYI - Status Update  Who is Calling: [facility]  Update: New Pharmacy  Okay to leave a detailed message?:  No return call needed

## 2021-06-07 NOTE — TELEPHONE ENCOUNTER
Please let her know that she needs to follow up on her diabetes.  I can leave lab orders for her.  She can schedule a visit in late summer

## 2021-06-07 NOTE — TELEPHONE ENCOUNTER
RN cannot approve Refill Request    RN can NOT refill this medication Protocol failed and NO refill given.      Gemma Mcgrath, [facility] Triage/Med Refill 4/17/2020    Requested Prescriptions   Pending Prescriptions Disp Refills     SUMAtriptan (IMITREX) 50 MG tablet [Pharmacy Med Name: SUMATRIPTAN  50MG  TAB] 9 tablet 11     Sig: TAKE 1 TABLET BY MOUTH  EVERY 2 HOURS AS NEEDED FOR MIGRAINE. MAXIMUM OF 2  TABLETS PER 24 HOURS       Triptans Refill Protocol Failed - 4/17/2020 10:37 AM        Failed - PCP or prescribing provider visit in past 12 months       Last office visit with prescriber/PCP: 3/21/2019 Candis Vee MD OR same dept: Visit date not found OR same specialty: 3/21/2019 Candis Vee MD  Last physical: 9/20/2017 Last MTM visit: Visit date not found   Next visit within 3 mo: Visit date not found  Next physical within 3 mo: Visit date not found  Prescriber OR PCP: Candis Vee MD  Last diagnosis associated with med order: 1. Migraines  - SUMAtriptan (IMITREX) 50 MG tablet [Pharmacy Med Name: SUMATRIPTAN  50MG  TAB]; TAKE 1 TABLET BY MOUTH  EVERY 2 HOURS AS NEEDED FOR MIGRAINE. MAXIMUM OF 2  TABLETS PER 24 HOURS  Dispense: 9 tablet; Refill: 0    If protocol passes may refill for 12 months if within 3 months of last provider visit (or a total of 15 months).

## 2021-06-09 NOTE — TELEPHONE ENCOUNTER
RN cannot approve Refill Request    RN can NOT refill this medication PCP messaged that patient is overdue for Labs and Office Visit and Protocol failed and NO refill given. Last office visit: 3/21/2019 Candis Vee MD Last Physical: 9/20/2017 Last MTM visit: Visit date not found Last visit same specialty: 3/21/2019 Candis Vee MD.  Next visit within 3 mo: Visit date not found  Next physical within 3 mo: Visit date not found      Corrine Bowen, [facility] Triage/Med Refill 6/26/2020    Requested Prescriptions   Pending Prescriptions Disp Refills     triamterene-hydrochlorothiazide (DYAZIDE) 37.5-25 mg per capsule [Pharmacy Med Name: TRIAMT-HCTZ 37.5-25MG CAPSULE] 90 capsule 3     Sig: TAKE 1 CAPSULE BY MOUTH  DAILY       Diuretics/Combination Diuretics Refill Protocol  Failed - 6/25/2020  9:36 PM        Failed - Visit with PCP or prescribing provider visit in past 12 months     Last office visit with prescriber/PCP: 3/21/2019 Candis Vee MD OR same dept: Visit date not found OR same specialty: 3/21/2019 Candis Vee MD  Last physical: 9/20/2017 Last MTM visit: Visit date not found   Next visit within 3 mo: Visit date not found  Next physical within 3 mo: Visit date not found  Prescriber OR PCP: Candis Vee MD  Last diagnosis associated with med order: 1. Hypertension  - triamterene-hydrochlorothiazide (DYAZIDE) 37.5-25 mg per capsule [Pharmacy Med Name: TRIAMT-HCTZ 37.5-25MG CAPSULE]; Take 1 capsule by mouth daily.  Dispense: 90 capsule; Refill: 3    If protocol passes may refill for 12 months if within 3 months of last provider visit (or a total of 15 months).             Failed - Serum Potassium in past 12 months      No results found for: LN-POTASSIUM          Failed - Serum Sodium in past 12 months      No results found for: LN-SODIUM          Failed - Blood pressure on file in past 12 months     BP Readings from Last 1 Encounters:   03/21/19 130/80             Failed  - Serum Creatinine in past 12 months      Creatinine   Date Value Ref Range Status   03/21/2019 1.14 (H) 0.60 - 1.10 mg/dL Final

## 2021-06-09 NOTE — TELEPHONE ENCOUNTER
RN cannot approve Refill Request    RN can NOT refill this medication Protocol failed and NO refill given.     Gemma Mcgrath, [facility] Triage/Med Refill 6/23/2020    Requested Prescriptions   Pending Prescriptions Disp Refills     traZODone (DESYREL) 50 MG tablet [Pharmacy Med Name: TRAZODONE  50MG  TAB] 180 tablet 3     Sig: TAKE 1 TO 2 TABLETS BY  MOUTH AT BEDTIME       Tricyclics/Misc Antidepressant/Antianxiety Meds Refill Protocol Failed - 6/22/2020 10:11 PM        Failed - PCP or prescribing provider visit in last year     Last office visit with prescriber/PCP: 3/21/2019 Candis Vee MD OR same dept: Visit date not found OR same specialty: 3/21/2019 Candis Vee MD  Last physical: 9/20/2017 Last MTM visit: Visit date not found   Next visit within 3 mo: Visit date not found  Next physical within 3 mo: Visit date not found  Prescriber OR PCP: Candis Vee MD  Last diagnosis associated with med order: 1. Insomnia  - traZODone (DESYREL) 50 MG tablet [Pharmacy Med Name: TRAZODONE  50MG  TAB]; TAKE 1 TO 2 TABLETS BY  MOUTH AT BEDTIME  Dispense: 180 tablet; Refill: 0    If protocol passes may refill for 12 months if within 3 months of last provider visit (or a total of 15 months).

## 2021-06-11 ENCOUNTER — OFFICE VISIT - HEALTHEAST (OUTPATIENT)
Dept: PHYSICAL THERAPY | Facility: REHABILITATION | Age: 56
End: 2021-06-11

## 2021-06-11 DIAGNOSIS — R29.898 SHOULDER WEAKNESS: ICD-10-CM

## 2021-06-11 DIAGNOSIS — M25.612 DECREASED RANGE OF MOTION OF SHOULDER, LEFT: ICD-10-CM

## 2021-06-11 NOTE — PROGRESS NOTES
[facility] Follow Up Note    Assessment/Plan:  1. Type 2 diabetes mellitus  She has not been in in approximately 1 year.  We will update labs today.  Recommendations: Medication recommendations will be amended her lab results.  Additionally, recommend that she continue with a daily walk.  Modify food choices.  Lose weight.  - Glycosylated Hemoglobin A1c  - Lipid Cascade FASTING    2. Acquired hypothyroidism  Labs pending.  She states she is taking her medication on an empty stomach.  - Thyroid Stimulating Hormone (TSH)    3. Medication monitoring encounter  Labs pending  - Comprehensive Metabolic Panel  - HM2(CBC w/o Differential)    4. Insomnia  Medications renewed  - traZODone (DESYREL) 50 MG tablet; Take 2 tablets every night  Dispense: 180 tablet; Refill: 3    5. Preventative health care  Mammography today.  Will arrange for [place] guard.  Follow-up for Pap smear in the fall  - Cologuard        The following high BMI interventions were performed this visit: encouragement to exercise  Candis Vee MD    Chief Complaint:  Chief Complaint   Patient presents with     Follow-up     Diabetes     Asthma       History of Present Illness:  Larissa is a 52 y.o. female who is here today for follow-up with regard to her usual medical problems.  Of note, she has acquired hypothyroidism and type 2 diabetes.  Also, she has elevated body mass index.  Her last visit was approximately 1 year ago.  She states she is not very compliant with checking blood sugars.  In fact, she no longer has a glucose monitoring device.  She is amenable to beginning to recheck sugars again.  She denies any symptoms and in fact states that she feels well.  She is sad today because she had to put her elderly dog Kiersten down.  This dog belonged to her sister Diamond who passed away a few years ago.  Now, she has 1 dog left who is a 13-year-old lab.  She continues to walk him on a daily basis.  Her physical activity, however, is low due  to his advancing age.    She denies any problems with chest pain, shortness of breath, new  bowel or bladder issues.    Health maintenance is reviewed.  She is having a mammogram today.  She does not have time for colonoscopy.  She would consider Scot guard.  She is due for a Pap smear in the fall    Review of Systems:  A comprehensive review of systems was performed and was otherwise negative    PFS:  Social History: She is a non-smoker.  She lives with a roommate in [place].  She has a significant other.  She is contemplating marriage after her 13-year-old Labrador retriever passes  History   Smoking Status     Never Smoker   Smokeless Tobacco     Not on file       Past History: Significant for obesity and type 2 diabetes.  She also has hyperlipidemia and hypothyroidism  Current Outpatient Prescriptions   Medication Sig Dispense Refill     atorvastatin (LIPITOR) 10 MG tablet Take 1 tablet (10 mg total) by mouth daily. 90 tablet 1     B INFANTIS/B ANI/B AMIE/B BIFID (PROBIOTIC 4X ORAL) Take by mouth daily.       cholecalciferol, vitamin D3, 1,000 unit tablet Take 1,000 Units by mouth daily.       cranberry 500 mg cap Take by mouth daily.       glimepiride (AMARYL) 2 MG tablet TAKE 1 TABLET DAILY AS     DIRECTED 90 tablet 3     glucosamine-chondroitin 500-400 mg tablet Take 1 tablet by mouth daily.       ibuprofen (ADVIL,MOTRIN) 200 MG tablet Take 200 mg by mouth every 6 (six) hours as needed for pain.       levothyroxine (SYNTHROID) 50 MCG tablet Take 1 tab daily in addition to the 200 mcg tab 90 tablet 3     levothyroxine (SYNTHROID, LEVOTHROID) 200 MCG tablet TAKE 1 TABLET ONCE DAILY 90 tablet 1     lisinopril (PRINIVIL,ZESTRIL) 5 MG tablet TAKE 1 TABLET ONCE DAILY 90 tablet 0     metFORMIN (GLUCOPHAGE) 1000 MG tablet TAKE 1 TABLET TWICE A DAY  WITH MEALS 180 tablet 1     MULTIVITAMIN WITH MINERALS (HAIR,SKIN & NAILS ORAL) Take 1 capsule by mouth daily.       SUMAtriptan (IMITREX) 50 MG tablet TAKE 1 TABLET  "EVERY 2 HOURSAS NEEDED FOR MIGRAINE.    MAXIMUM OF 2 TABLETS/24    HOURS 12 tablet 3     traZODone (DESYREL) 50 MG tablet Take 2 tablets every night 180 tablet 3     triamterene-hydrochlorothiazide (DYAZIDE) 37.5-25 mg per capsule TAKE 1 CAPSULE DAILY 90 capsule 3     blood glucose meter (ONETOUCH ULTRAMINI) Use 1 each As Directed as needed. Dispense glucometer brand per patient's insurance at pharmacy discretion.  0     No current facility-administered medications for this visit.        Family History: Nothing new .  Mother and sister have passed    Physical Exam:  General Appearance:   She appears at baseline and is in no acute distress  Vitals:    07/12/17 0742   BP: 126/68   Patient Site: Left Arm   Patient Position: Sitting   Cuff Size: Adult Large   Pulse: 78   Weight: (!) 288 lb (130.6 kg)   Height: 5' 3.75\" (1.619 m)     Wt Readings from Last 3 Encounters:   07/12/17 (!) 288 lb (130.6 kg)   06/29/16 (!) 286 lb (129.7 kg)   03/04/16 (!) 278 lb (126.1 kg)     Body mass index is 49.82 kg/(m^2).    EYES: Eyelids, conjunctiva, and sclera were normal. Pupils were normal. Cornea, iris, and lens were normal bilaterally.  HEAD, EARS, NOSE, MOUTH, AND THROAT: Head and face were normal. Hearing was normal to voice and the ears were normal to external exam. Nose appearance was normal and there was no discharge. Oropharynx was normal.  TMs were normal.  NECK: Neck appearance was normal. There were no neck masses and the thyroid was not enlarged.  RESPIRATORY: Breathing pattern was normal and the chest moved symmetrically.   Lung sounds were equal bilaterally.  CARDIOVASCULAR: Heart rate and rhythm were normal.  S1 and S2 were normal and there were no extra sounds or murmurs. Peripheral pulses in arms and legs were normal.  Jugular venous pressure was normal.  There was no peripheral edema.  GASTROINTESTINAL: The abdomen was normal in contour.  Bowel sounds were present.   Palpation detected no tenderness, mass, or " enlarged organs.   MUSCULOSKELETAL: Skeletal configuration was normal and muscle mass was normal for age. Joint appearance was overall normal.  LYMPHATIC: There were no enlarged nodes.  SKIN/HAIR/NAILS: Skin color was normal.  There were no abnormal skin lesions.  Hair and nails were normal.  NEUROLOGIC: The patient was alert and oriented to person, place, time, and circumstance. Speech was normal. Cranial nerves were normal. Motor strength was normal for age. The patient was normally coordinated.  PSYCHIATRIC:  Mood and affect were normal and the patient had normal recent and remote memory. The patient's judgment and insight were normal.

## 2021-06-11 NOTE — TELEPHONE ENCOUNTER
Refill Request  Did you contact pharmacy: Patient has new mail order pharmacy.  They do not transfer.  She is asking for order for the metformin please.   Medication name:   Requested Prescriptions     Pending Prescriptions Disp Refills     metFORMIN (GLUCOPHAGE) 1000 MG tablet 180 tablet 4     Sig: Take 1 tablet (1,000 mg total) by mouth 2 (two) times a day with meals.     Who prescribed the medication: Candis Vee MD  Requested Pharmacy: [facility]  Is patient out of medication: No.  7 days left  Patient notified refills processed in 3 business days:  yes  Okay to leave a detailed message: yes

## 2021-06-11 NOTE — TELEPHONE ENCOUNTER
RN cannot approve Refill Request    RN can NOT refill this medication PCP messaged that patient is overdue for Labs. Last office visit: 3/21/2019 Candis Vee MD Last Physical: 9/20/2017 Last MTM visit: Visit date not found Last visit same specialty: 3/21/2019 Candis Vee MD.  Next visit within 3 mo: Visit date not found  Next physical within 3 mo: Visit date not found      Martha Grace, [facility] Triage/Med Refill 9/19/2020    Requested Prescriptions   Pending Prescriptions Disp Refills     metFORMIN (GLUCOPHAGE) 1000 MG tablet 180 tablet 4     Sig: Take 1 tablet (1,000 mg total) by mouth 2 (two) times a day with meals.       Metformin Refill Protocol Failed - 9/18/2020  1:33 PM        Failed - Blood pressure in last 12 months     BP Readings from Last 1 Encounters:   03/21/19 130/80             Failed - LFT or AST or ALT in last 12 months     Albumin   Date Value Ref Range Status   03/21/2019 4.0 3.5 - 5.0 g/dL Final     Bilirubin, Total   Date Value Ref Range Status   03/21/2019 0.7 0.0 - 1.0 mg/dL Final     Bilirubin, Direct   Date Value Ref Range Status   05/15/2013 0.22 <0.31 mg/dL Final     Alkaline Phosphatase   Date Value Ref Range Status   03/21/2019 87 45 - 120 U/L Final     AST   Date Value Ref Range Status   03/21/2019 12 0 - 40 U/L Final     ALT   Date Value Ref Range Status   03/21/2019 14 0 - 45 U/L Final     Protein, Total   Date Value Ref Range Status   03/21/2019 7.1 6.0 - 8.0 g/dL Final                Failed - GFR or Serum Creatinine in last 6 months     GFR MDRD Non Af Amer   Date Value Ref Range Status   03/21/2019 50 (L) >60 mL/min/1.73m2 Final     GFR MDRD Af Amer   Date Value Ref Range Status   03/21/2019 60 (L) >60 mL/min/1.73m2 Final             Failed - Visit with PCP or prescribing provider visit in last 6 months or next 3 months     Last office visit with prescriber/PCP: Visit date not found OR same dept: Visit date not found OR same specialty: 3/21/2019 Mariusz  Candis Quiroz MD Last physical: Visit date not found Last MTM visit: Visit date not found         Next appt within 3 mo: Visit date not found  Next physical within 3 mo: Visit date not found  Prescriber OR PCP: Candis Vee MD  Last diagnosis associated with med order: 1. Diabetes (H)  - metFORMIN (GLUCOPHAGE) 1000 MG tablet; Take 1 tablet (1,000 mg total) by mouth 2 (two) times a day with meals.  Dispense: 180 tablet; Refill: 4     If protocol passes may refill for 12 months if within 3 months of last provider visit (or a total of 15 months).           Failed - A1C in last 6 months     Hemoglobin A1c   Date Value Ref Range Status   03/21/2019 7.2 (H) 3.5 - 6.0 % Final               Failed - Microalbumin in last year      Microalbumin, Random Urine   Date Value Ref Range Status   08/31/2012 10 1 - 20 mg/L Final

## 2021-06-11 NOTE — TELEPHONE ENCOUNTER
RN cannot approve Refill Request    RN can NOT refill this medication PCP messaged that patient is overdue for Office Visit. Last office visit: 3/21/2019 Candis Vee MD Last Physical: 9/20/2017 Last MTM visit: Visit date not found Last visit same specialty: 3/21/2019 Candis Vee MD.  Next visit within 3 mo: Visit date not found  Next physical within 3 mo: Visit date not found      Paz Guerrero, [facility] Triage/Med Refill 8/31/2020    Requested Prescriptions   Pending Prescriptions Disp Refills     traZODone (DESYREL) 50 MG tablet [Pharmacy Med Name: TRAZODONE  50MG  TAB] 180 tablet 3     Sig: TAKE 1 TO 2 TABLETS BY  MOUTH AT BEDTIME       Tricyclics/Misc Antidepressant/Antianxiety Meds Refill Protocol Failed - 8/31/2020  9:45 PM        Failed - PCP or prescribing provider visit in last year     Last office visit with prescriber/PCP: 3/21/2019 Candis Vee MD OR same dept: Visit date not found OR same specialty: 3/21/2019 Candis Vee MD  Last physical: 9/20/2017 Last MTM visit: Visit date not found   Next visit within 3 mo: Visit date not found  Next physical within 3 mo: Visit date not found  Prescriber OR PCP: Candis Vee MD  Last diagnosis associated with med order: 1. Insomnia  - traZODone (DESYREL) 50 MG tablet [Pharmacy Med Name: TRAZODONE  50MG  TAB]; TAKE 1 TO 2 TABLETS BY  MOUTH AT BEDTIME  Dispense: 180 tablet; Refill: 3    If protocol passes may refill for 12 months if within 3 months of last provider visit (or a total of 15 months).

## 2021-06-13 NOTE — PROGRESS NOTES
Assessment and Plan:     1. Preventative health care  She is up-to-date on mammogram, colon cancer screening but due for dental exam.  Ophthalmologic care is up-to-date.  Pap and pelvic updated today.  Have discussed the importance of healthy diet and activity with regard to elevated body mass index and type 2 diabetes  - Gynecologic Cytology (PAP Smear)    2. Type 2 diabetes mellitus  Most recent glycohemoglobin at goal.  We will continue current medications  Recommendations: 20 pound weight loss over the next 3-4 months.  Have discussed the importance of increasing physical activity each day.  Additionally, she needs to monitor portions, journal food intake and make better choices  - Basic Metabolic Panel    3. Acquired hypothyroidism  We will update lab due to recent med change  - Thyroid Stimulating Hormone (TSH)        The following high BMI interventions were performed this visit: encouragement to exercise    Candis Vee MD  9/20/2017    Chief Complaint:  Chief Complaint   Patient presents with     Annual Exam       History of Present Illness:  Larissa is a 52 y.o. female with type 2 diabetes mellitus, hypertension or and hyperlipidemia.  She is here for her annual examination.  Of note, in general she is doing okay.  She does state that she is noticing multisystem joint aches and pains.  She has achiness of her shoulders, knees and hands.  She is lamenting this fact.    She otherwise states that she has been somewhat noncompliant with her diabetic diet.  She and her roommate are remodeling a bathroom.  Apparently, the materials for the remodel are in the kitchen and therefore kitchen is not accessible.  They have been eating out.  As a result, she has gained about 5 pounds.    She denies any low blood sugar events or polyuria or polydipsia    Health maintenance is reviewed.  She is due for dental care.  Ophthalmologic care goes every 6 months.  Scot guard was recently completed.  Mammography is  "up-to-date Pap smear is updated today    Review of Systems:    The rest of the review of systems are negative for all systems.    PFSH:  Social History: She is single.  She has a significant other.  They are contemplating marriage in the future.  She is a non-smoker.  She lives with a roommate.  She has worked for the bank for 16 years  History   Smoking Status     Never Smoker   Smokeless Tobacco     Not on file       Past Medical History: As in the snapshot  No Known Allergies    Family History: Her uncle has shingles.  Both her mother and sister as well as father are   Family History   Problem Relation Age of Onset     Heart attack Father      Depression Mother      Coronary artery disease Mother      Hypothyroidism Mother      Osteoarthritis Mother      Graves' disease Sister      Other Sister      Lumbar Back pain        Physical Exam:  Vitals:    17 1143   BP: 118/74   Patient Site: Left Arm   Patient Position: Sitting   Cuff Size: Adult Large   Pulse: 72   Weight: (!) 293 lb (132.9 kg)   Height: 5' 3.75\" (1.619 m)     Wt Readings from Last 3 Encounters:   17 (!) 293 lb (132.9 kg)   17 (!) 288 lb (130.6 kg)   16 (!) 286 lb (129.7 kg)       General Appearance:  Alert, cooperative, no distress, appears stated age   Head:  Normocephalic, without obvious abnormality, atraumatic   Eyes:  PERRL, conjunctiva/corneas clear, EOM's intact   Ears:  Normal TM's and external ear canals, both ears   Nose: Nares normal, septum midline,mucosa normal, no drainage    Throat: Lips, mucosa, and tongue normal; teeth and gums normal   Neck: Supple, symmetrical, trachea midline, no adenopathy;  thyroid: not enlarged, symmetric, no tenderness/mass/nodules; no carotid bruit or JVD   Back:   Symmetric, no curvature, ROM normal, no CVA tenderness   Lungs:   Clear to auscultation bilaterally, respirations unlabored   Breasts:  No breast masses, tenderness, asymmetry, or nipple discharge.   Heart:  Regular " rate and rhythm, S1 and S2 normal, no murmur, rub, or gallop   Abdomen:   Soft, non-tender, bowel sounds active all four quadrants,  no masses, no organomegaly   Genitalia: Normally developed genitalia with no external lesions or eruptions.  Vagina and cervix show no lesions, inflammation, discharge or tenderness.  No cystocele.  Uterus normal size and position.  No adnexal mass or tenderness.  Andrew difficult due to enlarged body habitus   Rectal:  No hemorrhoids noted externally   Extremities: Extremities normal, atraumatic, no cyanosis or edema   Skin: Skin color, texture, turgor normal, no rashes or lesions   Lymph nodes: Cervical, supraclavicular, and axillary nodes normal   Neurologic: Normal, CN 2-12 intact     Medications:  Current Outpatient Prescriptions   Medication Sig Dispense Refill     atorvastatin (LIPITOR) 10 MG tablet Take 1 tablet (10 mg total) by mouth daily. 90 tablet 1     B INFANTIS/B ANI/B AMIE/B BIFID (PROBIOTIC 4X ORAL) Take by mouth daily.       blood glucose meter (ONETOUCH ULTRAMINI) Use 1 each As Directed as needed. Dispense glucometer brand per patient's insurance at pharmacy discretion.  0     cholecalciferol, vitamin D3, 1,000 unit tablet Take 1,000 Units by mouth daily.       cranberry 500 mg cap Take by mouth daily.       glimepiride (AMARYL) 2 MG tablet TAKE 1 TABLET DAILY AS DIRECTED 90 tablet 0     glucosamine-chondroitin 500-400 mg tablet Take 1 tablet by mouth daily.       levothyroxine (SYNTHROID, LEVOTHROID) 200 MCG tablet TAKE 1 TABLET ONCE DAILY 90 tablet 1     levothyroxine (SYNTHROID, LEVOTHROID) 75 MCG tablet Take 1 tab daily in addition to the 200 mcg tab 90 tablet 3     lisinopril (PRINIVIL,ZESTRIL) 5 MG tablet TAKE 1 TABLET ONCE DAILY 90 tablet 0     metFORMIN (GLUCOPHAGE) 1000 MG tablet TAKE 1 TABLET TWICE A DAY  WITH MEALS 180 tablet 1     MULTIVITAMIN WITH MINERALS (HAIR,SKIN & NAILS ORAL) Take 1 capsule by mouth daily.       SUMAtriptan (IMITREX) 50 MG tablet TAKE 1  TABLET EVERY 2 HOURSAS NEEDED FOR MIGRAINE.    MAXIMUM OF 2 TABLETS/24    HOURS 12 tablet 3     traZODone (DESYREL) 50 MG tablet Take 2 tablets every night 180 tablet 3     triamterene-hydrochlorothiazide (DYAZIDE) 37.5-25 mg per capsule TAKE 1 CAPSULE DAILY 90 capsule 3     albuterol (PROAIR HFA;PROVENTIL HFA;VENTOLIN HFA) 90 mcg/actuation inhaler Inhale 2 puffs every 6 (six) hours as needed for wheezing. 1 Inhaler 3     No current facility-administered medications for this visit.        Immunizations:  Immunization History   Administered Date(s) Administered     Influenza, inj, historic 11/23/2010, 08/31/2012     Influenza, seasonal,quad inj 6-35 mos 09/30/2014     Td, historic 08/12/2009     Tdap 08/12/2009

## 2021-06-13 NOTE — TELEPHONE ENCOUNTER
Called pt regarding cologuard; said she switched pharmacies and her new pharmacy doesn't cover her thyroid medication. Said she has been trying to call for weeks and can never get through, and after waiting on hold for 20+ minutes, hangs up and tries again another day. Asked if I could let someone know to get this taken care of ASAP so she can get her rx filled. Please advise.     Thanks

## 2021-06-13 NOTE — TELEPHONE ENCOUNTER
Patient is due for cologuard re-screen. Spoke to patient, ok to send another kit and re-test. Please sign pending order.    Thanks

## 2021-06-14 NOTE — TELEPHONE ENCOUNTER
Refill Approved    Rx renewed per Medication Renewal Policy. Medication was last renewed on 11/18/20.    Gemma Mcgrath, [facility] Triage/Med Refill 2/3/2021     Requested Prescriptions   Pending Prescriptions Disp Refills     levothyroxine (SYNTHROID) 75 MCG tablet [Pharmacy Med Name: SYNTHROID TAB 0.075MG] 90 tablet 0     Sig: TAKE 1 TABLET DAILY IN     ADDITION TO THE 200MCG     TABLET       Thyroid Hormones Protocol Passed - 2/2/2021  8:11 PM        Passed - Provider visit in past 12 months or next 3 months     Last office visit with prescriber/PCP: 1/8/2021 Candis Vee MD OR same dept: Visit date not found OR same specialty: 1/8/2021 Candis Vee MD  Last physical: 9/20/2017 Last MTM visit: Visit date not found   Next visit within 3 mo: Visit date not found  Next physical within 3 mo: Visit date not found  Prescriber OR PCP: Candis Vee MD  Last diagnosis associated with med order: 1. Hypothyroidism  - SYNTHROID 75 mcg tablet [Pharmacy Med Name: SYNTHROID TAB 0.075MG]; TAKE 1 TABLET DAILY IN     ADDITION TO THE 200MCG     TABLET  Dispense: 90 tablet; Refill: 0    If protocol passes may refill for 12 months if within 3 months of last provider visit (or a total of 15 months).             Passed - TSH on file in past 12 months for patient age 12 & older     TSH   Date Value Ref Range Status   01/08/2021 0.49 0.30 - 5.00 uIU/mL Final

## 2021-06-14 NOTE — PROGRESS NOTES
[facility] Follow Up Note    Assessment/Plan:  1. Type 2 diabetes mellitus with hyperglycemia, without long-term current use of insulin (H)  Significant decrease control-Lewis hemoglobin over 10.  She declines giving injections at this juncture.  Recommendation: Proceed with GLP-1 agonist.  Will try Rybelsus to begin with 3 mg daily.  If patient tolerates-will increase to 7 mg.  Ordered glucose meter as she does not have a working unit.  She needs to check fasting and presupper blood sugars.  Referral to diabetic education for an update on nutrition.  May need insulin start but will hold on that for now.  Have encouraged physical activity and movement.  She really needs weight loss.  - Glycosylated Hemoglobin A1c  - semaglutide (RYBELSUS) 3 mg tablet; Take 3 mg by mouth daily.  Dispense: 30 tablet; Refill: 0  - Ambulatory referral to Diabetes Education Program (CDE)  - blood-glucose meter Misc; Check glucose fasting/ before supper daily  Dispense: 1 each; Refill: 0    2. Stage 3a chronic kidney disease  Ordered labs  - HM2(CBC w/o Differential)  - Comprehensive Metabolic Panel    3. Acquired hypothyroidism  Ordered labs  - Thyroid [place]      4. Visit for screening mammogram  Ordered  - Mammo Screening Bilateral; Future    5. Screen for colon cancer  ordered  - Cologuard    7. Chronic left shoulder pain  Will proceed with MRI of the left shoulder and physical therapy.  She clearly has frozen shoulder and possible rotator cuff tear on exam  - MR Shoulder Without Contrast Left; Future  - Ambulatory referral to Physical Therapy    8. Morbid obesity (H)  Diabetic education including nutrition    9. Encounter for preventive care    - Hepatitis C Antibody (Anti-HCV)      Follow-up in 4 to 12 months    Candis Vee MD    Chief Complaint:  Chief Complaint   Patient presents with     Medication Management     Diabetes       History of Present Illness:  Larissa is a 55 y.o. female who has not been in in a  while.  She has obesity, type 2 diabetes, chronic kidney disease and also has a left shoulder injury.    With regard to diabetes, she has not been testing her sugars.  She does not have an active glucose meter.  She states her eating has remained stable.  She believes that she does not overeat sweets or starches.  She states she does not snack.  She has acquired an air Fryer and feels that she is eating healthier.  She does not exercise.  She denies polyuria or polydipsia.    Additionally, she reports that she has left shoulder pain.  It started in April.  It has continued to progress such that she cannot raise her arm above her head.  She has difficulty with anterior and posterior movements.  She has modest pain requiring occasional ibuprofen and Tylenol.  She does not have any numbness or tingling.    Preventative issues are reviewed.  She is due for a multitude of screening tests and a flu shot.  This will be updated today.    Reactive airways have been stable.  GRAIN headaches have been stable    Review of Systems:  A comprehensive review of systems was performed and was otherwise negative    PFS:  Social History: She has recently moved to a cottage.  She is very happy.  She has a new job that pays more.  She is very excited.  Social History     Tobacco Use   Smoking Status Never Smoker   Smokeless Tobacco Never Used       Past History:   Past Medical History:   Diagnosis Date     Migraine        Current Outpatient Medications   Medication Sig Dispense Refill     albuterol (PROAIR HFA;PROVENTIL HFA;VENTOLIN HFA) 90 mcg/actuation inhaler Inhale 2 puffs every 6 (six) hours as needed for wheezing. 1 Inhaler 3     atorvastatin (LIPITOR) 10 MG tablet TAKE 1 TABLET DAILY 90 tablet 3     B INFANTIS/B ANI/B AMIE/B BIFID (PROBIOTIC 4X ORAL) Take by mouth daily.       blood glucose meter (ONETOUCH ULTRAMINI) Use 1 each As Directed as needed. Dispense glucometer brand per patient's insurance at pharmacy discretion.  0      "cholecalciferol, vitamin D3, 1,000 unit tablet Take 1,000 Units by mouth daily.       cranberry 500 mg cap Take by mouth daily.       glimepiride (AMARYL) 2 MG tablet Take 2 tablets (4 mg total) by mouth daily. as directed 180 tablet 0     glucosamine-chondroitin 500-400 mg tablet Take 1 tablet by mouth daily.       levothyroxine (SYNTHROID, LEVOTHROID) 200 MCG tablet TAKE 1 TABLET ONCE DAILY 90 tablet 0     levothyroxine (SYNTHROID, LEVOTHROID) 75 MCG tablet TAKE 1 TABLET DAILY IN ADDITION TO  MCG TABLET. 90 tablet 0     lisinopril (PRINIVIL,ZESTRIL) 5 MG tablet TAKE 1 TABLET ONCE DAILY 90 tablet 0     metFORMIN (GLUCOPHAGE) 1000 MG tablet Take 1 tablet (1,000 mg total) by mouth 2 (two) times a day with meals. 180 tablet 4     MULTIVITAMIN WITH MINERALS (HAIR,SKIN & NAILS ORAL) Take 1 capsule by mouth daily.       SUMAtriptan (IMITREX) 50 MG tablet TAKE 1 TABLET BY MOUTH  EVERY 2 HOURS AS NEEDED FOR MIGRAINE. MAXIMUM OF 2  TABLETS PER 24 HOURS 9 tablet 11     traZODone (DESYREL) 50 MG tablet Take 1-2 tablets ( mg total) by mouth at bedtime. 180 tablet 3     triamterene-hydrochlorothiazide (DYAZIDE) 37.5-25 mg per capsule Take 1 capsule by mouth daily. 90 capsule 3     blood-glucose meter Misc Check glucose fasting/ before supper daily 1 each 0     semaglutide (RYBELSUS) 3 mg tablet Take 3 mg by mouth daily. 30 tablet 0     No current facility-administered medications for this visit.        Family History:     Physical Exam:  General Appearance:   Appears at baseline.  Her BMI is in the morbid obese range  Vitals:    01/08/21 0803   BP: 132/74   Patient Site: Right Arm   Patient Position: Sitting   Cuff Size: Adult Regular   Pulse: 97   SpO2: 98%   Weight: (!) 290 lb (131.5 kg)   Height: 5' 3.75\" (1.619 m)     Wt Readings from Last 3 Encounters:   01/08/21 (!) 290 lb (131.5 kg)   03/21/19 (!) 289 lb (131.1 kg)   09/20/17 (!) 293 lb (132.9 kg)     Body mass index is 50.17 kg/m .    No focal neuro " deficits are noted  Lungs are clear to auscultation and percussion  Cardiac exam reveals regular rate and rhythm  Left shoulder is examined and has marked limited range of motion.  No crepitation is noted.  She is not able to get her arm up toward her ear or above her head.  Both anterior and exterior rotation are limited

## 2021-06-14 NOTE — TELEPHONE ENCOUNTER
Refill Approved    Rx renewed per Medication Renewal Policy. Medication was last renewed on 8/28/18.    Gemma Mcgrath, [facility] Triage/Med Refill 1/25/2021     Requested Prescriptions   Pending Prescriptions Disp Refills     glimepiride (AMARYL) 2 MG tablet 180 tablet 0     Sig: Take 2 tablets (4 mg total) by mouth daily. as directed       Oral Hypoglycemics Refill Protocol Passed - 1/24/2021  7:51 PM        Passed - Visit with PCP or prescribing provider visit in last 6 months       Last office visit with prescriber/PCP: 1/8/2021 OR same dept: 1/8/2021 Candis Vee MD OR same specialty: 1/8/2021 Candis Vee MD Last physical: Visit date not found Last MTM visit: Visit date not found         Next appt within 3 mo: Visit date not found  Next physical within 3 mo: Visit date not found  Prescriber OR PCP: Candis Vee MD  Last diagnosis associated with med order: 1. Type 2 diabetes mellitus (H)  - glimepiride (AMARYL) 2 MG tablet; Take 2 tablets (4 mg total) by mouth daily. as directed  Dispense: 180 tablet; Refill: 0     If protocol passes may refill for 12 months if within 3 months of last provider visit (or a total of 15 months).           Passed - A1C in last 6 months     Hemoglobin A1c   Date Value Ref Range Status   01/08/2021 10.3 (H) <=5.6 % Final               Passed - Microalbumin in last year      Microalbumin, Random Urine   Date Value Ref Range Status   01/08/2021 1.28 0.00 - 1.99 mg/dL Final                  Passed - Blood pressure in last year     BP Readings from Last 1 Encounters:   01/08/21 132/74             Passed - Serum creatinine in last year     Creatinine   Date Value Ref Range Status   01/08/2021 1.31 (H) 0.60 - 1.10 mg/dL Final

## 2021-06-15 NOTE — PROGRESS NOTES
[facility] Follow Up Note    Assessment/Plan:  1. Type 2 diabetes mellitus with hyperglycemia, without long-term current use of insulin (H)  Unfortunately, she did not get her meter handout and therefore we have no data with regard to her blood sugars.  She feels that her appetite is decreased.  She is making better food choices.  Now that the weather is nice, she will be getting outside for 1 to 215-minute walks per day.  Recommendation: Continue Metformin and Rybelsus at 7 mg.  If glycohemoglobin is not improved, will increase dose to 14 mg.  Labs are pending  - Glycosylated Hemoglobin A1c  - blood glucose meter (GLUCOMETER); Use 1 each As Directed daily. Dispense glucometer brand per patient's insurance at pharmacy discretion.  Dispense: 1 each; Refill: 0  - blood glucose test (CONTOUR NEXT TEST STRIPS) strips; Use 1 each As Directed daily. Dispense brand per patient's insurance at pharmacy discretion.  Dispense: 100 strip; Refill: 3  - generic lancets (FINGERSTIX LANCETS); Use 1 each As Directed daily. Dispense brand per patient's insurance at pharmacy discretion.  Dispense: 100 each; Refill: 3    2. Stage 3a chronic kidney disease  Is aware that her kidney function is off.  She is drinking greater than 80 ounces of water per day.  We will update kidney function on new medication  - Basic Metabolic Panel    3. Morbid obesity (H)  Courage ongoing weight loss        Candis Vee MD    Chief Complaint:  Chief Complaint   Patient presents with     Follow-up       History of Present Illness:  Larissa is a 55 y.o. female who is here today for follow-up with regard to type 2 diabetes.  Of note, after last visit, her hemoglobin A1c was markedly elevated.  She has been noncompliant with diet, etc.  She was started on Rybelsus which is covered by her insurance.  She has tolerated this well.  She was initially started on 3 mg.  After 1 month, it was raised 7 mg.  She has been on 7 mg approximately 1 month.   Additionally, she states she has been working hard to improve her diet.  She states she eats salads and soups.  She does not eat very many sweets.  Her diet is low in rice, pasta and bread.  She does not eat many potatoes.  She denies polyuria or polydipsia.    She did not get her glucose meter.  This was reordered.  She is to contact the office if the meter does not get approved.    Review of Systems:  A comprehensive review of systems was performed and was otherwise negative    PFSH:  Social History: Single and lives independently.  She is working from home  Social History     Tobacco Use   Smoking Status Never Smoker   Smokeless Tobacco Never Used       Past History:   Current Outpatient Medications   Medication Sig Dispense Refill     albuterol (PROAIR HFA;PROVENTIL HFA;VENTOLIN HFA) 90 mcg/actuation inhaler Inhale 2 puffs every 6 (six) hours as needed for wheezing. 1 Inhaler 3     atorvastatin (LIPITOR) 10 MG tablet TAKE 1 TABLET DAILY 90 tablet 3     B INFANTIS/B ANI/B AMIE/B BIFID (PROBIOTIC 4X ORAL) Take by mouth daily.       blood glucose meter (ONETOUCH ULTRAMINI) Use 1 each As Directed as needed. Dispense glucometer brand per patient's insurance at pharmacy discretion.  0     blood-glucose meter Misc Check glucose fasting/ before supper daily 1 each 0     cholecalciferol, vitamin D3, 1,000 unit tablet Take 1,000 Units by mouth daily.       cranberry 500 mg cap Take by mouth daily.       glimepiride (AMARYL) 2 MG tablet Take 2 tablets (4 mg total) by mouth daily. as directed 180 tablet 3     glucosamine-chondroitin 500-400 mg tablet Take 1 tablet by mouth daily.       levothyroxine (SYNTHROID) 75 MCG tablet TAKE 1 TABLET DAILY IN     ADDITION TO THE 200MCG     TABLET 90 tablet 3     levothyroxine (SYNTHROID, LEVOTHROID) 200 MCG tablet TAKE 1 TABLET ONCE DAILY 90 tablet 0     lisinopril (PRINIVIL,ZESTRIL) 5 MG tablet TAKE 1 TABLET ONCE DAILY 90 tablet 0     metFORMIN (GLUCOPHAGE) 1000 MG tablet Take 1  "tablet (1,000 mg total) by mouth 2 (two) times a day with meals. 180 tablet 4     MULTIVITAMIN WITH MINERALS (HAIR,SKIN & NAILS ORAL) Take 1 capsule by mouth daily.       semaglutide (RYBELSUS) 7 mg tablet Take 7 mg by mouth daily. 90 tablet 3     SUMAtriptan (IMITREX) 50 MG tablet TAKE 1 TABLET BY MOUTH  EVERY 2 HOURS AS NEEDED FOR MIGRAINE. MAXIMUM OF 2  TABLETS PER 24 HOURS 9 tablet 11     traZODone (DESYREL) 50 MG tablet Take 1-2 tablets ( mg total) by mouth at bedtime. 180 tablet 3     triamterene-hydrochlorothiazide (DYAZIDE) 37.5-25 mg per capsule Take 1 capsule by mouth daily. 90 capsule 3     blood glucose meter (GLUCOMETER) Use 1 each As Directed daily. Dispense glucometer brand per patient's insurance at pharmacy discretion. 1 each 0     blood glucose test (CONTOUR NEXT TEST STRIPS) strips Use 1 each As Directed daily. Dispense brand per patient's insurance at pharmacy discretion. 100 strip 3     generic lancets (FINGERSTIX LANCETS) Use 1 each As Directed daily. Dispense brand per patient's insurance at pharmacy discretion. 100 each 3     No current facility-administered medications for this visit.        Family History:     Physical Exam:  General Appearance:   Appears well and in no acute distress  Vitals:    03/12/21 0804 03/12/21 0808   BP: (!) 142/100 130/88   Patient Site: Left Arm Left Arm   Patient Position: Sitting Sitting   Cuff Size: Adult Large Adult Large   Pulse: 100    SpO2: 98%    Weight: (!) 288 lb 8 oz (130.9 kg)    Height: 5' 3.75\" (1.619 m)      Wt Readings from Last 3 Encounters:   03/12/21 (!) 288 lb 8 oz (130.9 kg)   01/08/21 (!) 290 lb (131.5 kg)   03/21/19 (!) 289 lb (131.1 kg)     Body mass index is 49.91 kg/m .        "

## 2021-06-15 NOTE — PROGRESS NOTES
[facility]   Shoulder Initial Evaluation    Patient Name: Larissa Cowan  Date of evaluation: 2/28/2021  Referral Diagnosis: Chronic left shoulder pain  Referring provider: Candis Vee MD  Visit Diagnosis:     ICD-10-CM    1. Decreased range of motion of shoulder, left  M25.612    2. Shoulder weakness  R29.898        Assessment:        Larissa Cowan is a 55 y.o. female who presents to therapy today with chief complaints of left shoulder pain. Onset date of sx began about a year ago, with insidious onset.  Patient reports reduced and painful left shoulder AROM.  The pt's functional limitations as a result of her symptoms include difficulty reaching overhead into a cupboard, UB dressing, washing her hair, carrying groceries/laundry and has impacted her sleep.  Pt demo's signs and sx consistent with adhesive capsulitis as the PT eval reveals a capsular pattern to the pt's shoulder  AROM limitations; additionally, the pt's age and gender make her a likely candidate for this diagnosis.  The pt would benefit from skilled 1:1 PT to address her physical and functional impairments.  Pt. is appropriate for skilled PT intervention as outlined in the Plan of Care (POC).  Pt. is a good candidate for skilled PT services to improve pain levels and function.    Goals:  Pt. will demonstrate/verbalize independence in self-management of condition in : 12 weeks  Pt. will be independent with home exercise program in : 12 weeks  Patient will reach / maintain arm movement: overhead;behind;for home chores;for dressing;with full ROM;with less pain;in 12 weeks;with partial ROM    Pt will: demo improved R shoulder external rotation by 45 degrees for greater ease of UB dressing in 12 weeks.      Patient's expectations/goals are realistic.    Barriers to Learning or Achieving Goals:  No Barriers.       Plan / Patient Instructions:        Plan of Care:   Communication with: Referral Source  Patient Related  Instruction: Nature of Condition;Treatment plan and rationale;Self Care instruction;Basis of treatment;Body mechanics;Posture;Expected outcome;Next steps;Precautions  Times per Week: 1-2  Number of Weeks: 10  Number of Visits: 12  Discharge Planning: When pt meets PT goals; or when pt progress plateaus; or when pt is Independent with HEP for ongoing symptom management  Precautions / Restrictions : None  Therapeutic Exercise: ROM;Stretching;Strengthening  Neuromuscular Reeducation: posture;balance/proprioception;kinesio tape;core  Manual Therapy: myofascial release;soft tissue mobilization;joint mobilization;muscle energy  Other Plan #1: Therapeutic Activity      POC and pathology of condition were reviewed with patient.  Pt. is in agreement with the Plan of Care  A Home Exercise Program (HEP) was initiated today.  Pt. was instructed in exercises by PT and patient was given a handout with detailed instructions.  Treatment techniques, plan of care, and goals were discussed with the patient.  The patient agrees to the plan as outlined.  The plan of care is dynamic and will be modified on an ongoing basis.    Plan for next visit: Introduce manual therapy, pulley exercises  .   Subjective:       Onset Date: March/April 2020    Mechanism of Injury:  Gradual onset and worsened over the summer.  Worse when it's very cold or damp outside.  Pt did have a recent MRI that r/o a rotator cuff tear    Previous level of function:  Pt able to reach overhead and don UB dressing and deodorant without difficulty.    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    Ongoing symptoms:  Limited L shoulder AROM, stiffness    L shoulder Pain:     -Frequency:  Daily   -Location:  Superior shoulder aspect that slightly radiates into deltoid   -Description of pain:  Stiff can be a sharp, direct pain.  Feels weak   -Exacerbating factors:  Reaching overhead, reaching behind.  Tossing  objects   -Relieving factors:  Rest, pain meds, cold/hot packs   -L shoulder Pain history:  None   -Are you currently seeing a chiropractor?:  No   -Current:  5-6/10, Best:  0/10, Worst:  11/10    Functional limitations are described as occurring with:   personal cares donning shirt or jacket, donning bra, combing hair, washing hair and washing body  reaching overhead and behind back     Objective:      Note: Items left blank indicates the item was not performed or not indicated at the time of the evaluation.    Patient Outcome Measures :  None at PT evaluation    Shoulder Examination  1. Decreased range of motion of shoulder, left     2. Shoulder weakness       Involved side: Left  Posture Observation:      General sitting posture is  fair.  Cervical Clearing: Normal    Shoulder/Elbow ROM    Date: 2/28/2021     Shoulder and Elbow ROM ( )   AROM in degrees AROM in degrees AROM in degrees    Right Left Right Left Right Left   Shoulder Flexion (0-180 )        Shoulder Abduction (0-180 )        Shoulder Extension (0-60 ) NT NT       Shoulder ER (0-90 ) WNL 5-15       Shoulder IR (0-70 ) WNL 60       Elbow Flexion (150 ) WNL WNL       Elbow Extension (0 ) WNL WNL        PROM in degrees PROM in degrees PROM in degrees    Right Left Right Left Right Left   Shoulder Flexion (0-180 )  140       Shoulder Abduction (0-180 )  126       Shoulder ER (0-90 )  21       Shoulder IR (0-70 )  70         Shoulder/Elbow Strength: L shoulder grossly weakened, 3 to 3+/5  Flexibility: Limited L UE mobility    Palpation: No tenderness to palpation in the rotator cuff    Joint Assessment:  Sternoclavicular Joint Assessment: WNL  Acromioclavicular Joint Assessment: WNL  Scapulothoracic Joint Assessment: WNL.  Glenohumeral Joint Assessment:WNL.    Shoulder Special Tests    Impingement Cluster Right (+/-) Left (+/-) Rotator Cuff Tests Right (+/-) Left (+/-)   Burns-Mic   Drop Arm Sign     Painful Arc  + Hornblowers      Infraspinatus Test   ERLS     AC Tests Right (+/-) Left (+/-) IRLS     Active Compression   Labral Tests Right (+/-) Left (+/-)   Crossbody Adduction   Biceps Load Test II     AC Resisted Extension   Jerk Test     GH Instability Tests Right (+/-) Left (+/-) Yumiko Test     Sulcus Sign   Biceps Tests Right (+/-) Left (+/-)   Apprehension   Speed     Relocation   Lexi ballesteros     Other:   Other:     Other:   Other:         Treatment Today    TREATMENT MINUTES COMMENTS   Evaluation 23 -Patient educated on pathology  -Discussed POC   Self-care/ Home management     Manual therapy     Neuromuscular Re-education     Therapeutic Activity     Therapeutic Exercises 23 -Demo/performance of HEP  Exercise #1: Supine cane/wand exercises for AAROM: flexion and ER x 10 reps ea.  Comment #1: SL L reach and roll x 10 reps  Exercise #2: Standing scap retraction 2 x 10 reps     Gait training     Modality__________________                Total 46    Blank areas are intentional and mean the treatment did not include these items.     PT Evaluation Code: (Please list factors)  Patient History/Comorbidities: See above  Examination: See above  Clinical Presentation: New  Clinical Decision Making: New    Patient History/  Comorbidities Examination  (body structures and functions, activity limitations, and/or participation restrictions) Clinical Presentation Clinical Decision Making (Complexity)   No documented Comorbidities or personal factors 1-2 Elements Stable and/or uncomplicated Low   1-2 documented comorbidities or personal factor 3 Elements Evolving clinical presentation with changing characteristics Moderate   3-4 documented comorbidities or personal factors 4 or more Unstable and unpredictable High            Yenny Chong, PT, DPT  2/28/2021  3:33 PM

## 2021-06-16 PROBLEM — N18.30 CHRONIC KIDNEY DISEASE, STAGE 3 (H): Status: ACTIVE | Noted: 2021-01-08

## 2021-06-16 PROBLEM — E66.01 MORBID OBESITY (H): Status: ACTIVE | Noted: 2019-03-21

## 2021-06-16 NOTE — PROGRESS NOTES
[facility] Rehabilitation Daily Progress Note    Patient Name: Larissa Cowan  Date: 4/2/2021  Visit #: 3/12  PTA visit #:  NA  Referral Diagnosis: Chronic left shoulder pain  Referring provider: Candis Vee MD  Visit Diagnosis:     ICD-10-CM    1. Decreased range of motion of shoulder, left  M25.612    2. Shoulder weakness  R29.898        Assessment from Initial Evaluation:  Larissa Cowan is a 55 y.o. female who presents to therapy today with chief complaints of left shoulder pain. Onset date of sx began about a year ago, with insidious onset.  Patient reports reduced and painful left shoulder AROM.  The pt's functional limitations as a result of her symptoms include difficulty reaching overhead into a cupboard, UB dressing, washing her hair, carrying groceries/laundry and has impacted her sleep.  Pt demo's signs and sx consistent with adhesive capsulitis as the PT eval reveals a capsular pattern to the pt's shoulder  AROM limitations; additionally, the pt's age and gender make her a likely candidate for this diagnosis.  The pt would benefit from skilled 1:1 PT to address her physical and functional impairments.    Precautions / Restrictions : None      Assessment:   Pt comes to PT feeling good and hopeful that her shoulder is making good gains and that she is able to wash her hair and reach for items with greater ease involving her L UE.  The pt demos good improvement with her L shoulder AROM but ER continues to be the most limited.  The pt's HEP was progressed this date.  The pt tolerated today's progression of exercises and manual therapy well.  PT will plan to focus on progressing ROM and exercises for ongoing progress towards the pt's LTGs.  HEP/POC compliance is  fair .  Patient demonstrates understanding/independence with home program.    Goal Status:  Pt. will demonstrate/verbalize independence in self-management of condition in : 12 weeks  Pt. will be independent with home exercise  program in : 12 weeks  Patient will reach / maintain arm movement: overhead;behind;for home chores;for dressing;with full ROM;with less pain;in 12 weeks;with partial ROM    Pt will: demo improved R shoulder external rotation by 45 degrees for greater ease of UB dressing in 12 weeks.      Plan / Patient Education:     Continue with initial plan of care.  Progress with home program as tolerated.    Subjective:     Pain Ratin/10 in L shoulder    Pt continues to feel like her shoulder is improving and that she was able to reach behind herself and grab something without thinking about it.    Objective:     Shoulder AROM (21) in degrees, pt seated unless otherwise noted:   Flexion: 136 deg  ABDuction: 125 deg  External rotation (supine with arm ABDucted to 90 deg): 38 deg    Therapeutic Exercise    For aerobic conditioning and topromote active movement in the B shoulders and scapulas to decrease pain:  -UBE x 6 min, 2 min forward/backward, 1 min forward/backward    Following exercises to improve shoulder ROM and strength for easier use of ADL's.  Exercise #1: Supine cane/wand exercises for AAROM: flexion and ER x 10 reps ea.  Comment #1: SL L reach and roll x 10 reps. Pt demos limited ROM  Exercise #2: SL scap sets with L GHJ external rotation x 10 reps.  Pt demos limted L ER.  Comment #2: Standing scap retraction with L2 theraband 2 x 10 reps.  Increased time to complete with additional tactile cues for correct form.  Pt tends to lead with the upper traps but improved after cueing.  Exercise #3: Wall climbs x 10 reps for flexion and ABDuction x 3 reps ea.  Cues for tall posture.  Pt demos limited ROM, ABD>Flexion  Comment #3: Standing doorway L shld ER stretch x 30 sec.  Cues to keep elbow at pt's side as she rotates away from doorway.    Manual Therapy  In order to address pain and ROM restrictions; pt supine unless otherwise noted:  -L GHJ distraction gr II, 3 x 45 seconds  -L GHJ inferior glide gr II-III, 3 x  60 seconds  -Sidelying on R with L ribs 4-8 P/A's, gr II-III      Treatment Today     TREATMENT MINUTES COMMENTS   Evaluation     Self-care/ Home management     Manual therapy 15 See above   Neuromuscular Re-education     Therapeutic Activity     Therapeutic Exercises 25 See above   Gait training     Modality__________________                Total 40    Blank areas are intentional and mean the treatment did not include these items.       Yenny Chong, PT, DPT  4/2/2021

## 2021-06-16 NOTE — PROGRESS NOTES
[facility] Rehabilitation Daily Progress Note    Patient Name: Larissa Cowan  Date: 4/9/2021  Visit #: 3/12  PTA visit #:  NA  Referral Diagnosis: Chronic left shoulder pain  Referring provider: Candis Vee MD  Visit Diagnosis:     ICD-10-CM    1. Decreased range of motion of shoulder, left  M25.612    2. Shoulder weakness  R29.898        Assessment from Initial Evaluation:  Larissa Cowan is a 55 y.o. female who presents to therapy today with chief complaints of left shoulder pain. Onset date of sx began about a year ago, with insidious onset.  Patient reports reduced and painful left shoulder AROM.  The pt's functional limitations as a result of her symptoms include difficulty reaching overhead into a cupboard, UB dressing, washing her hair, carrying groceries/laundry and has impacted her sleep.  Pt demo's signs and sx consistent with adhesive capsulitis as the PT eval reveals a capsular pattern to the pt's shoulder  AROM limitations; additionally, the pt's age and gender make her a likely candidate for this diagnosis.  The pt would benefit from skilled 1:1 PT to address her physical and functional impairments.    Precautions / Restrictions : None      Assessment:   Pt comes to PT feeling good and hopeful that her shoulder is continuing to make good gains and that she is able to wash her hair and reach for items with greater ease involving her L UE.  The pt demos good improvement with her L shoulder AROM but ER continues to be the most limited.  The pt's HEP was progressed this date.  The pt tolerated today's progression of exercises and manual therapy well.  PT will plan to focus on progressing ROM and exercises for ongoing progress towards the pt's LTGs.    HEP/POC compliance is  fair .  Patient demonstrates understanding/independence with home program.    Goal Status:  Pt. will demonstrate/verbalize independence in self-management of condition in : 12 weeks  Pt. will be independent with home  exercise program in : 12 weeks  Patient will reach / maintain arm movement: overhead;behind;for home chores;for dressing;with full ROM;with less pain;in 12 weeks;with partial ROM    Pt will: demo improved R shoulder external rotation by 45 degrees for greater ease of UB dressing in 12 weeks.      Plan / Patient Education:   Reassess shoulder AROM next Rx session    Continue with initial plan of care.  Progress with home program as tolerated.    Subjective:     Pain Ratin/10 in L shoulder    Pt continues to feel like her shoulder is improving and that reaching for items on shelves and getting dressed is easier.    Objective:     Shoulder AROM (21) in degrees, pt seated unless otherwise noted:   Flexion: 136 deg  ABDuction: 125 deg  External rotation (supine with arm ABDucted to 90 deg): 38 deg    Therapeutic Exercise    For aerobic conditioning and topromote active movement in the B shoulders and scapulas to decrease pain:  -UBE x 8 min, 2 min forward/backward    Following exercises to improve shoulder ROM and strength for easier use of ADL's.  Exercise #1: Supine cane/wand exercises for AAROM: flexion and ER x 10 reps ea.-Not performed in session; part of HEP  Comment #1: SL L reach and roll x 10 reps. Pt demos limited ROM  Exercise #2: SL scap sets with L GHJ external rotation x 10 reps.  Pt demos limted L ER.  Comment #2: Standing scap retraction with L2 theraband 2 x 10 reps.  Increased time to complete with additional tactile cues for correct form.  Pt tends to lead with the upper traps but improved after cueing.  Exercise #3: Wall climbs x 10 reps for flexion and ABDuction x 3 reps ea.  Cues for tall posture.  Pt demos limited ROM, ABD>Flexion.-Not performed in session; part of HEP  Comment #3: Standing doorway L shld ER stretch x 30 sec.  Cues to keep elbow at pt's side as she rotates away from doorway.  Exercise #4: Pulley for L shoulder AAROM for flexion and ABDuction x 20 reps ea direction.  Comment  #4: Standing AROM ER with pt reaching behind back x 10 reps. Pt can only get to back pocket.    Manual Therapy  In order to address pain and ROM restrictions; pt supine unless otherwise noted:  -L GHJ distraction gr II, 3 x 45 seconds  -L GHJ inferior glide gr II-III, 3 x 60 seconds  -Contract-relax for external rotation as tolerated, with L GHJ x 4 min total      Treatment Today     TREATMENT MINUTES COMMENTS   Evaluation     Self-care/ Home management     Manual therapy 8 See above   Neuromuscular Re-education     Therapeutic Activity     Therapeutic Exercises 38 See above   Gait training     Modality__________________                Total 46    Blank areas are intentional and mean the treatment did not include these items.       Yenny Chong, PT, DPT  4/9/2021

## 2021-06-16 NOTE — TELEPHONE ENCOUNTER
Refill Approved    Rx renewed per Medication Renewal Policy. Medication was last renewed on 11/18/20.    Yonatan Chamorro, [facility] Triage/Med Refill 4/5/2021     Requested Prescriptions   Pending Prescriptions Disp Refills     levothyroxine (SYNTHROID) 200 MCG tablet [Pharmacy Med Name: SYNTHROID TAB 0.2MG] 90 tablet 0     Sig: TAKE 1 TABLET ONCE DAILY       Thyroid Hormones Protocol Passed - 4/4/2021  7:16 AM        Passed - Provider visit in past 12 months or next 3 months     Last office visit with prescriber/PCP: 3/12/2021 Candis Vee MD OR same dept: Visit date not found OR same specialty: 3/12/2021 Candis Vee MD  Last physical: 9/20/2017 Last MTM visit: Visit date not found   Next visit within 3 mo: Visit date not found  Next physical within 3 mo: Visit date not found  Prescriber OR PCP: Candis Vee MD  Last diagnosis associated with med order: 1. Hypothyroidism  - SYNTHROID 200 mcg tablet [Pharmacy Med Name: SYNTHROID TAB 0.2MG]; TAKE 1 TABLET ONCE DAILY  Dispense: 90 tablet; Refill: 0    If protocol passes may refill for 12 months if within 3 months of last provider visit (or a total of 15 months).             Passed - TSH on file in past 12 months for patient age 12 & older     TSH   Date Value Ref Range Status   01/08/2021 0.49 0.30 - 5.00 uIU/mL Final

## 2021-06-16 NOTE — PROGRESS NOTES
[facility] Rehabilitation Daily Progress Note    Patient Name: Larissa Cowan  Date: 3/19/2021  Visit #: 2/12  PTA visit #:  NA  Referral Diagnosis: Chronic left shoulder pain  Referring provider: Candis Vee MD  Visit Diagnosis:     ICD-10-CM    1. Decreased range of motion of shoulder, left  M25.612    2. Shoulder weakness  R29.898        Assessment from Initial Evaluation:  Larissa Cowan is a 55 y.o. female who presents to therapy today with chief complaints of left shoulder pain. Onset date of sx began about a year ago, with insidious onset.  Patient reports reduced and painful left shoulder AROM.  The pt's functional limitations as a result of her symptoms include difficulty reaching overhead into a cupboard, UB dressing, washing her hair, carrying groceries/laundry and has impacted her sleep.  Pt demo's signs and sx consistent with adhesive capsulitis as the PT eval reveals a capsular pattern to the pt's shoulder  AROM limitations; additionally, the pt's age and gender make her a likely candidate for this diagnosis.  The pt would benefit from skilled 1:1 PT to address her physical and functional impairments.    Precautions / Restrictions : None      Assessment:   Pt returns to PT for initial subsequent Rx session.  The pt reported feeling ongoing pain and ROM limitations in her left shoulder but that it might be slightly improving.  The pt struggles L GHJ ROM but mostly with external rotation and ABDuction as well as demos weakness of the scapula stabilizing mm making it difficult for the pt to complete overhead activites.  The pt tolerated today's progression of exercises and manual therapy well.  PT will plan to focus on progressing ROM and exercises for ongoing progress towards the pt's LTGs.  HEP/POC compliance is  fair .  Patient demonstrates understanding/independence with home program.    Goal Status:  Pt. will demonstrate/verbalize independence in self-management of condition in :  12 weeks  Pt. will be independent with home exercise program in : 12 weeks  Patient will reach / maintain arm movement: overhead;behind;for home chores;for dressing;with full ROM;with less pain;in 12 weeks;with partial ROM    Pt will: demo improved R shoulder external rotation by 45 degrees for greater ease of UB dressing in 12 weeks.      Plan / Patient Education:     Continue with initial plan of care.  Progress with home program as tolerated.    Subjective:     Pain Rating: 3/10 in L shoulder    Pt feels like her shoulder pain and ROM is improving since PT gary.  She reports daily compliance with her HEP.    Objective:     Therapeutic Exercise    For aerobic conditioning and topromote active movement in the B shoulders and scapulas to decrease pain:  -UBE x 6 min, 2 min forward/backward, 1 min forward/backward    Following exercises to improve shoulder ROM and strength for easier use of ADL's.  Exercise #1: Supine cane/wand exercises for AAROM: flexion and ER x 10 reps ea.  Comment #1: SL L reach and roll x 10 reps. Pt demos limited ROM  Exercise #2: Standing scap retraction with L1 theraband 2 x 10 reps.  Increased time to complete with additional tactile cues for correct form.  Pt tends to lead with the upper traps but improved after cueing.    Manual Therapy  In order to address pain and ROM restrictions; pt supine unless otherwise noted:  -L GHJ distraction gr II, 3 x 45 seconds  -L GHJ inferior glide gr II-III, 3 x 60 seconds  -Sidelying on R with L ribs 4-8 P/A's, gr II-III      Treatment Today     TREATMENT MINUTES COMMENTS   Evaluation     Self-care/ Home management     Manual therapy 12 See above   Neuromuscular Re-education     Therapeutic Activity     Therapeutic Exercises 23 See above   Gait training     Modality__________________                Total 35    Blank areas are intentional and mean the treatment did not include these items.       Yenny Chong, PT, DPT  3/19/2021

## 2021-06-16 NOTE — PROGRESS NOTES
[facility] Rehabilitation Daily Progress Note    Patient Name: Larissa Cowan  Date: 4/23/2021  Visit #: 6/12  PTA visit #:  NA  Referral Diagnosis: Chronic left shoulder pain  Referring provider: Candis Vee MD  Visit Diagnosis:     ICD-10-CM    1. Decreased range of motion of shoulder, left  M25.612    2. Shoulder weakness  R29.898        Assessment from Initial Evaluation:  Larissa Cowan is a 55 y.o. female who presents to therapy today with chief complaints of left shoulder pain. Onset date of sx began about a year ago, with insidious onset.  Patient reports reduced and painful left shoulder AROM.  The pt's functional limitations as a result of her symptoms include difficulty reaching overhead into a cupboard, UB dressing, washing her hair, carrying groceries/laundry and has impacted her sleep.  Pt demo's signs and sx consistent with adhesive capsulitis as the PT eval reveals a capsular pattern to the pt's shoulder  AROM limitations; additionally, the pt's age and gender make her a likely candidate for this diagnosis.  The pt would benefit from skilled 1:1 PT to address her physical and functional impairments.    Precautions / Restrictions : None      Assessment:   Despite ongoing limited external rotation ROM in the pt's L shoulder, she remains very happy with her progress with PT and feels like she can get dressed and reach for items with greater ease.  PT will continue at 1x every other week for 3 more visits to continue to to make gains towards the pt's shoulder ROM.      HEP/POC compliance is  fair .  Patient demonstrates understanding/independence with home program.    Goal Status:  Pt. will demonstrate/verbalize independence in self-management of condition in : 12 weeks  Pt. will be independent with home exercise program in : 12 weeks  Patient will reach / maintain arm movement: overhead;behind;for home chores;for dressing;with full ROM;with less pain;in 12 weeks;with partial ROM    Pt  will: demo improved R shoulder external rotation by 45 degrees for greater ease of UB dressing in 12 weeks.      Plan / Patient Education:   Continue with manual therapy and progress ROM exercises as tolerated    Continue with initial plan of care.  Progress with home program as tolerated.    Subjective:     Pain Ratin/10 in L shoulder    No significant complaints.  Pt remains happy with her progress.    Objective:     Shoulder AROM (21) in degrees, pt seated unless otherwise noted:   Flexion: 150 deg  ABDuction: 140 deg  External rotation (supine with arm ABDucted to 90 deg): 35 deg    Therapeutic Exercise    For aerobic conditioning and topromote active movement in the B shoulders and scapulas to decrease pain:  -NuStep x 10 min, level #5, avg METs: 2.7, SPM: 65    Following exercises to improve shoulder ROM and strength for easier use of ADL's.  Exercise #1: Supine cane/wand exercises for AAROM: flexion and ER x 10 reps ea.-Not performed in session; part of HEP  Comment #1: SL L reach and roll x 10 reps. Pt demos limited ROM  Exercise #2: SL scap sets with L GHJ external rotation and 2#  x 15 reps.  Pt demos limted L ER.  Comment #2: Standing scap retraction with L2 theraband 2 x 15 reps.  Increased time to complete with additional tactile cues for correct form.  Pt tends to lead with the upper traps but improved after cueing.  Exercise #3: Wall climbs x 10 reps for flexion and ABDuction x 5 reps ea.  Cues for tall posture.  Pt demos limited ROM, ABD>Flexion  Comment #3: Seated  L shld ER stretch with arm ABDucted x 30 sec.  Exercise #4: .  Comment #4: .    Manual Therapy  In order to address pain and ROM restrictions; pt supine unless otherwise noted:  -L GHJ distraction gr II, 3 x 45 seconds  -L GHJ inferior glide gr II-III, 3 x 60 seconds  -Contract-relax for external rotation as tolerated, with L GHJ x 4 min total      Treatment Today     TREATMENT MINUTES COMMENTS   Evaluation     Self-care/ Home  management     Manual therapy 10 See above   Neuromuscular Re-education     Therapeutic Activity     Therapeutic Exercises 38 See above   Gait training     Modality__________________                Total 48    Blank areas are intentional and mean the treatment did not include these items.       Yenny Chong, PT, DPT  4/23/2021

## 2021-06-16 NOTE — PROGRESS NOTES
[facility] Rehabilitation Daily Progress Note    Patient Name: Larissa Cowan  Date: 4/16/2021  Visit #: 3/12  PTA visit #:  NA  Referral Diagnosis: Chronic left shoulder pain  Referring provider: Candis Vee MD  Visit Diagnosis:     ICD-10-CM    1. Decreased range of motion of shoulder, left  M25.612    2. Shoulder weakness  R29.898        Assessment from Initial Evaluation:  Larissa Cowan is a 55 y.o. female who presents to therapy today with chief complaints of left shoulder pain. Onset date of sx began about a year ago, with insidious onset.  Patient reports reduced and painful left shoulder AROM.  The pt's functional limitations as a result of her symptoms include difficulty reaching overhead into a cupboard, UB dressing, washing her hair, carrying groceries/laundry and has impacted her sleep.  Pt demo's signs and sx consistent with adhesive capsulitis as the PT eval reveals a capsular pattern to the pt's shoulder  AROM limitations; additionally, the pt's age and gender make her a likely candidate for this diagnosis.  The pt would benefit from skilled 1:1 PT to address her physical and functional impairments.    Precautions / Restrictions : None      Assessment:   Pt is more painful in her L arm this date due to receiving her 2nd vaccine yesterday and her arm is sore. The pt's ER continues to be limited and painful.  Progress limited this date due to the pt's increased pain.  PT will plan to focus on progressing ROM and exercises for ongoing progress towards the pt's LTGs.    HEP/POC compliance is  fair .  Patient demonstrates understanding/independence with home program.    Goal Status:  Pt. will demonstrate/verbalize independence in self-management of condition in : 12 weeks  Pt. will be independent with home exercise program in : 12 weeks  Patient will reach / maintain arm movement: overhead;behind;for home chores;for dressing;with full ROM;with less pain;in 12 weeks;with partial  ROM    Pt will: demo improved R shoulder external rotation by 45 degrees for greater ease of UB dressing in 12 weeks.      Plan / Patient Education:   Continue with manual therapy and progress ROM exercises as tolerated    Continue with initial plan of care.  Progress with home program as tolerated.    Subjective:     Pain Ratin/10 in L shoulder    Pt reports that she had her 2nd COVID vaccine yesterday and her L shoulder/arm is much more sore today.    Objective:     Shoulder AROM (21) in degrees, pt seated unless otherwise noted:   Flexion: 150 deg  ABDuction: 140 deg  External rotation (supine with arm ABDucted to 90 deg): 35 deg    Therapeutic Exercise    For aerobic conditioning and topromote active movement in the B shoulders and scapulas to decrease pain:  -NuStep x 8 min, level #5, avg METs: 2.1, SPM: 60    Following exercises to improve shoulder ROM and strength for easier use of ADL's.  Exercise #1: Supine cane/wand exercises for AAROM: flexion and ER x 10 reps ea.-Not performed in session; part of HEP  Comment #1: SL L reach and roll x 10 reps. Pt demos limited ROM  Exercise #2: SL scap sets with L GHJ external rotation x 10 reps.  Pt demos limted L ER.  Comment #2: Standing scap retraction with L2 theraband 2 x 10 reps.  Increased time to complete with additional tactile cues for correct form.  Pt tends to lead with the upper traps but improved after cueing.  Exercise #3: Wall climbs x 10 reps for flexion and ABDuction x 3 reps ea.  Cues for tall posture.  Pt demos limited ROM, ABD>Flexion.-Not performed in session; part of HEP  Comment #3: Standing doorway L shld ER stretch x 30 sec.  Cues to keep elbow at pt's side as she rotates away from doorway.  Exercise #4: Pulley for L shoulder AAROM for flexion and ABDuction x 20 reps ea direction.  Comment #4: Standing AROM ER with pt reaching behind back x 10 reps. Pt can only get to back pocket.    Manual Therapy  In order to address pain and ROM  restrictions; pt supine unless otherwise noted:  -L GHJ distraction gr II, 3 x 45 seconds  -L GHJ inferior glide gr II-III, 3 x 60 seconds  -Contract-relax for external rotation as tolerated, with L GHJ x 4 min total      Treatment Today     TREATMENT MINUTES COMMENTS   Evaluation     Self-care/ Home management     Manual therapy 10 See above   Neuromuscular Re-education     Therapeutic Activity     Therapeutic Exercises 38 See above   Gait training     Modality__________________                Total 48    Blank areas are intentional and mean the treatment did not include these items.       Yenny Chong, PT, DPT  4/16/2021

## 2021-06-20 NOTE — PROGRESS NOTES
[facility] Follow Up Note    Assessment/Plan:  1. Type 2 diabetes mellitus (H)  Patient states that she is monitoring her food intake and walking regularly.  She is not doing regular glucose checks.  Recommendation: Update labs.  Do fasting sugar checks 2-3 times per week.  If glycohemoglobin in range, follow-up in 6 months.  Encourage regular daily walking.  Ophthalmologic exam is up-to-date  - HM2(CBC w/o Differential)  - Comprehensive Metabolic Panel  - Glycosylated Hemoglobin A1c  - Lipid Cascade FASTING    2. Acquired hypothyroidism  We will update labs  - Thyroid Cascade  - Parathyroid Hormone Intact    3. Insomnia  She is using to trazodone per night.  Will update prescription  - traZODone (DESYREL) 50 MG tablet; Take 1-2 tabs at bedtime  Dispense: 180 tablet; Refill: 3    4. Visit for screening mammogram  Ordered  - Mammo Screening Bilateral; Future    5. BMI 40.0-44.9, adult (H)  Encourage regular exercise          Candis Vee MD    Chief Complaint:  Chief Complaint   Patient presents with     Follow-up     Diabetes       History of Present Illness:  Larissa is a 53 y.o. female who is here today for follow-up with regard to her usual medical problems.  Of note, she has morbid obesity, type 2 diabetes, hypertension.  She cites no new complaints.  She states her year has been eventful in that her uncle passed away.  She is tearful when discussing this.  Also, she terminated a 9 year relationship when she found her significant other had been lying to her about a multitude of issues.  She is teary when discussing this today as well.    She has no new medical complaints.  She states she is trying to walk with regularity.  She continues to work many hours at her job.    Review of Systems:  A comprehensive review of systems was performed and was otherwise negative    PFSH:  Social History: She is single.  She is caring for her  sister's elderly dog.  History   Smoking Status     Never  Smoker   Smokeless Tobacco     Not on file       Past History:   Past Medical History:   Diagnosis Date     Migraine        Current Outpatient Prescriptions   Medication Sig Dispense Refill     albuterol (PROAIR HFA;PROVENTIL HFA;VENTOLIN HFA) 90 mcg/actuation inhaler Inhale 2 puffs every 6 (six) hours as needed for wheezing. 1 Inhaler 3     atorvastatin (LIPITOR) 10 MG tablet Take 1 tablet (10 mg total) by mouth daily. 90 tablet 3     B INFANTIS/B ANI/B AMIE/B BIFID (PROBIOTIC 4X ORAL) Take by mouth daily.       blood glucose meter (ONETOUCH ULTRAMINI) Use 1 each As Directed as needed. Dispense glucometer brand per patient's insurance at pharmacy discretion.  0     cholecalciferol, vitamin D3, 1,000 unit tablet Take 1,000 Units by mouth daily.       cranberry 500 mg cap Take by mouth daily.       glimepiride (AMARYL) 2 MG tablet TAKE 1 TABLET DAILY AS DIRECTED 90 tablet 0     glucosamine-chondroitin 500-400 mg tablet Take 1 tablet by mouth daily.       levothyroxine (SYNTHROID, LEVOTHROID) 200 MCG tablet TAKE 1 TABLET ONCE DAILY 90 tablet 0     levothyroxine (SYNTHROID, LEVOTHROID) 75 MCG tablet Take 1 tab daily in addition to the 200 mcg tab 90 tablet 3     lisinopril (PRINIVIL,ZESTRIL) 5 MG tablet TAKE 1 TABLET ONCE DAILY 90 tablet 0     metFORMIN (GLUCOPHAGE) 1000 MG tablet TAKE 1 TABLET TWICE A DAY WITH MEALS 180 tablet 1     MULTIVITAMIN WITH MINERALS (HAIR,SKIN & NAILS ORAL) Take 1 capsule by mouth daily.       SUMAtriptan (IMITREX) 50 MG tablet TAKE 1 TABLET EVERY 2 HOURSAS NEEDED FOR MIGRAINE.    MAXIMUM OF 2 TABLETS/24    HOURS 12 tablet 3     traZODone (DESYREL) 50 MG tablet Take 1-2 tabs at bedtime 180 tablet 3     triamterene-hydrochlorothiazide (DYAZIDE) 37.5-25 mg per capsule TAKE 1 CAPSULE DAILY 90 capsule 0     No current facility-administered medications for this visit.        Family History: Her mother passed away.  Her sister also passed away from a pain medication overdose    Physical  Exam:  General Appearance:   She is pleasant and well-appearing and in no acute distress  There were no vitals filed for this visit.  Wt Readings from Last 3 Encounters:   09/20/17 (!) 293 lb (132.9 kg)   07/12/17 (!) 288 lb (130.6 kg)   06/29/16 (!) 286 lb (129.7 kg)     There is no height or weight on file to calculate BMI.    EYES: Eyelids, conjunctiva, and sclera were normal. Pupils were normal. Cornea, iris, and lens were normal bilaterally.  HEAD, EARS, NOSE, MOUTH, AND THROAT: Head and face were normal. Hearing was normal to voice and the ears were normal to external exam. Nose appearance was normal and there was no discharge. Oropharynx was normal.  TMs were normal.  NECK: Neck appearance was normal. There were no neck masses and the thyroid was not enlarged.  RESPIRATORY: Breathing pattern was normal and the chest moved symmetrically.   Lung sounds were equal bilaterally.  CARDIOVASCULAR: Heart rate and rhythm were normal.  S1 and S2 were normal and there were no extra sounds or murmurs. Peripheral pulses in arms and legs were normal.  Jugular venous pressure was normal.  There was no peripheral edema.  GASTROINTESTINAL: The abdomen was normal in contour, but obese.  Bowel sounds were present.   Palpation detected no tenderness, mass, or enlarged organs.   MUSCULOSKELETAL: Skeletal configuration was normal and muscle mass was normal for age. Joint appearance was overall normal.  LYMPHATIC: There were no enlarged nodes.  SKIN/HAIR/NAILS: Skin color was normal.  There were no abnormal skin lesions.  Hair and nails were normal.  NEUROLOGIC: The patient was alert and oriented to person, place, time, and circumstance. Speech was normal. Cranial nerves were normal. Motor strength was normal for age. The patient was normally coordinated.  PSYCHIATRIC:  Mood and affect were normal and the patient had normal recent and remote memory. The patient's judgment and insight were normal.

## 2021-06-25 ENCOUNTER — OFFICE VISIT - HEALTHEAST (OUTPATIENT)
Dept: PHYSICAL THERAPY | Facility: REHABILITATION | Age: 56
End: 2021-06-25

## 2021-06-25 DIAGNOSIS — R29.898 SHOULDER WEAKNESS: ICD-10-CM

## 2021-06-25 DIAGNOSIS — M25.612 DECREASED RANGE OF MOTION OF SHOULDER, LEFT: ICD-10-CM

## 2021-06-25 NOTE — PROGRESS NOTES
"[facility] Follow Up Note    Assessment/Plan:  1. Type 2 diabetes mellitus with hyperglycemia, without long-term current use of insulin (H)  Patient is occasionally noncompliant with medication.  She states she \"knows what to do \".  Encourage regular exercise and compliance with medications and diabetic diet.  We will update labs today and advise based on results.  Of note, she has had a 5 pound weight loss since last visit  - Glycosylated Hemoglobin A1c  - LDL Cholesterol, Direct    2. Acquired hypothyroidism  We will update labs  - Thyroid Cascade    3. Medication monitoring encounter  We will update labs  - HM2(CBC w/o Differential)  - Comprehensive Metabolic Panel    4. Migraines  Medications updated.  Migraine headaches exacerbate during change of season.  Recommendation: If headaches increase in intensity, consider Topamax which may be a good prophylactic medication which would also help with weight loss  - SUMAtriptan (IMITREX) 50 MG tablet; TAKE 1 TABLET EVERY 2 HOURSAS NEEDED FOR MIGRAINE.    MAXIMUM OF 2 TABLETS/24    HOURS  Dispense: 12 tablet; Refill: 3    5. Morbid obesity (H)  Encourage regular exercise and more efficient eating      Follow-up in 3 months    Candis Vee MD    Chief Complaint:  Chief Complaint   Patient presents with     Follow-up       History of Present Illness:  Larissa is a 53 y.o. female who is here today for follow-up with regard to her usual medical problems of hypothyroidism, type 2 diabetes and morbid obesity.  Since our last visit, not much has changed.  However, she reports that she has continued to suffer many losses.  Her half sister recently passed away from cancer.  Also, a good friend  from pancreatic cancer as well.  She states she is feeling somewhat sad.  Her mother and sister  unexpectedly a few years back.  She states her long-term relationship had ended a year ago.  She states overall and despite all of this, she believes her mood is okay " and that she is doing fine.    She checks blood sugars on occasion.  She denies any low blood sugar readings.  She does not have any blood sugar data today.  She denies polyuria and polydipsia.    Review of Systems:  A comprehensive review of systems was performed and was otherwise negative    [facility]:  Social History: She is single.  She lives independently.  She works full-time and has a dog named Toño.  He belonged to her sister Diamond  Social History     Tobacco Use   Smoking Status Never Smoker   Smokeless Tobacco Never Used       Past History:   Past Medical History:   Diagnosis Date     Migraine        Current Outpatient Medications   Medication Sig Dispense Refill     albuterol (PROAIR HFA;PROVENTIL HFA;VENTOLIN HFA) 90 mcg/actuation inhaler Inhale 2 puffs every 6 (six) hours as needed for wheezing. 1 Inhaler 3     atorvastatin (LIPITOR) 10 MG tablet Take 1 tablet (10 mg total) by mouth daily. 90 tablet 3     B INFANTIS/B ANI/B AMIE/B BIFID (PROBIOTIC 4X ORAL) Take by mouth daily.       blood glucose meter (ONETOUCH ULTRAMINI) Use 1 each As Directed as needed. Dispense glucometer brand per patient's insurance at pharmacy discretion.  0     cholecalciferol, vitamin D3, 1,000 unit tablet Take 1,000 Units by mouth daily.       cranberry 500 mg cap Take by mouth daily.       glimepiride (AMARYL) 2 MG tablet Take 2 tablets (4 mg total) by mouth daily. as directed 180 tablet 0     glucosamine-chondroitin 500-400 mg tablet Take 1 tablet by mouth daily.       levothyroxine (SYNTHROID, LEVOTHROID) 200 MCG tablet TAKE 1 TABLET ONCE DAILY 90 tablet 0     levothyroxine (SYNTHROID, LEVOTHROID) 75 MCG tablet Take 1 tab daily in addition to the 200 mcg tab 90 tablet 3     lisinopril (PRINIVIL,ZESTRIL) 5 MG tablet TAKE 1 TABLET ONCE DAILY 90 tablet 0     metFORMIN (GLUCOPHAGE) 1000 MG tablet TAKE 1 TABLET TWICE A DAY WITH MEALS 180 tablet 1     MULTIVITAMIN WITH MINERALS (HAIR,SKIN & NAILS ORAL) Take 1 capsule by mouth  "daily.       SUMAtriptan (IMITREX) 50 MG tablet TAKE 1 TABLET EVERY 2 HOURSAS NEEDED FOR MIGRAINE.    MAXIMUM OF 2 TABLETS/24    HOURS 12 tablet 3     traZODone (DESYREL) 50 MG tablet Take 1-2 tabs at bedtime 180 tablet 3     triamterene-hydrochlorothiazide (DYAZIDE) 37.5-25 mg per capsule TAKE 1 CAPSULE DAILY 90 capsule 3     No current facility-administered medications for this visit.        Family History: Half-sister recently passed away from cancer    Physical Exam:  General Appearance:   She is pleasant and well-appearing and in no acute distress  Vitals:    03/21/19 0756   BP: 130/80   Patient Site: Left Arm   Patient Position: Sitting   Cuff Size: Adult Regular   Pulse: 82   SpO2: 98%   Weight: (!) 289 lb (131.1 kg)   Height: 5' 3.75\" (1.619 m)     Wt Readings from Last 3 Encounters:   03/21/19 (!) 289 lb (131.1 kg)   09/20/17 (!) 293 lb (132.9 kg)   07/12/17 (!) 288 lb (130.6 kg)     Body mass index is 50 kg/m .        "

## 2021-06-26 NOTE — PROGRESS NOTES
[facility] Daily Progress Note    Patient Name: Larissa Cowan  Date: 6/14/2021  Visit #: 7/12  PTA visit #:  NA  Referral Diagnosis: Chronic left shoulder pain  Referring provider: Candis Vee MD  Visit Diagnosis:     ICD-10-CM    1. Decreased range of motion of shoulder, left  M25.612    2. Shoulder weakness  R29.898        Assessment from Initial Evaluation:  Larissa Cowan is a 55 y.o. female who presents to therapy today with chief complaints of left shoulder pain. Onset date of sx began about a year ago, with insidious onset.  Patient reports reduced and painful left shoulder AROM.  The pt's functional limitations as a result of her symptoms include difficulty reaching overhead into a cupboard, UB dressing, washing her hair, carrying groceries/laundry and has impacted her sleep.  Pt violetta's signs and sx consistent with adhesive capsulitis as the PT eval reveals a capsular pattern to the pt's shoulder  AROM limitations; additionally, the pt's age and gender make her a likely candidate for this diagnosis.  The pt would benefit from skilled 1:1 PT to address her physical and functional impairments.    No data recorded      Assessment:   Pt newton overall improved L shoulder AROM, including external rotation though it remains limited.  The pt remains very happy with her progress with PT and feels like she can get dressed and reach for items with greater ease.  Pt wanted to keep her last remaining visit; plan to reassess ROM and review HEP for discharge as pt has made good progress towards her goals.      HEP/POC compliance is  fair .  Patient demonstrates understanding/independence with home program.    Goal Status:  Goal Status:  Pt. will demonstrate/verbalize independence in self-management of condition in : 12 weeks  Pt. will be independent with home exercise program in : 12 weeks  Patient will reach / maintain arm movement: overhead;behind;for home chores;for dressing;with full  ROM;with less pain;in 12 weeks;with partial ROM--Progressing towards, pt demos improved ROM.  See below for ROM measurements     Pt will: demo improved R shoulder external rotation by 45 degrees for greater ease of UB dressing in 12 weeks.--Progressing towards, pt demos improved ROM.  See below for ROM measurements      Plan / Patient Education:   Pt has single visit remaining. Pt wanted to keep remaining visit and will plan to discharge at that time.    Continue with initial plan of care.  Progress with home program as tolerated.    Subjective:     Pain Ratin/10 in L shoulder    No significant complaints.  Pt remains happy with her progress.  Charissa renner [phone]    Objective:     Shoulder A/PROM (21) in degrees, pt seated unless otherwise noted:   Flexion: 150/160 deg   ABDuction: 160/170 deg  External rotation (supine with arm ABDucted to 90 deg): 40/55 deg    Therapeutic Exercise    For aerobic conditioning and topromote active movement in the B shoulders and scapulas to decrease pain:  -NuStep x 10 min, level #6, avg METs: 2.7, SPM: 69    Following exercises to improve shoulder ROM and strength for easier use of ADL's.  Exercise #1: Supine cane/wand exercises for AAROM: flexion and ER x 10 reps ea.-Not performed in session; part of HEP  Comment #1: SL L reach and roll x 10 reps. Pt demos limited ROM  Exercise #2: SL scap sets with L GHJ external rotation and 2#  x 15 reps.  Pt demos limted L ER.  Comment #2: Standing scap retraction with L2 theraband 2 x 15 reps.  Increased time to complete with additional tactile cues for correct form.  Pt tends to lead with the upper traps but improved after cueing.  Exercise #3: Wall climbs x 10 reps for flexion and ABDuction x 5 reps ea.  Cues for tall posture.  Pt demos limited ROM, ABD>Flexion  Comment #3: Seated  L shld ER stretch with arm ABDucted x 30 sec.  Exercise #4: .  Comment #4: .    Manual Therapy  In order to address pain and ROM restrictions; pt  supine unless otherwise noted:  -L GHJ distraction gr II, 3 x 45 seconds  -L GHJ inferior glide gr II-III, 3 x 60 seconds  -Contract-relax for external rotation as tolerated, with L GHJ x 4 min total      Treatment Today     TREATMENT MINUTES COMMENTS   Evaluation     Self-care/ Home management     Manual therapy 10 See above   Neuromuscular Re-education     Therapeutic Activity     Therapeutic Exercises 30 See above   Gait training     Modality__________________                Total 40    Blank areas are intentional and mean the treatment did not include these items.       Yenny Chong, PT, DPT  6/14/2021

## 2021-06-27 ENCOUNTER — HEALTH MAINTENANCE LETTER (OUTPATIENT)
Age: 56
End: 2021-06-27

## 2021-07-03 NOTE — ADDENDUM NOTE
Addendum Note by Graeme Perla MD at 10/4/2017 12:00 PM     Author: Graeme Perla MD Service: -- Author Type: Physician    Filed: 10/4/2017 12:00 PM Encounter Date: 10/3/2017 Status: Signed    : Graeme Perla MD (Physician)    Addended by: GRAEME PERLA on: 10/4/2017 12:00 PM        Modules accepted: Orders

## 2021-07-03 NOTE — ADDENDUM NOTE
Addendum Note by Mushtaq Porter CMA at 10/4/2017 11:58 AM     Author: Mushtaq Porter CMA Service: -- Author Type: Certified Medical Assistant    Filed: 10/4/2017 11:58 AM Encounter Date: 10/3/2017 Status: Signed    : Mushtaq Porter CMA (Certified Medical Assistant)    Addended by: MUSHTAQ PORTER on: 10/4/2017 11:58 AM        Modules accepted: Orders

## 2021-07-03 NOTE — ADDENDUM NOTE
Addendum Note by Miles Vee MD at 9/21/2017  9:27 AM     Author: Miles Vee MD Service: -- Author Type: Physician    Filed: 9/21/2017  9:27 AM Encounter Date: 9/20/2017 Status: Signed    : Miles Vee MD (Physician)    Addended by: MILES VEE on: 9/21/2017 09:27 AM        Modules accepted: Orders

## 2021-07-07 NOTE — PROGRESS NOTES
[facility] Daily Progress Note/DISCHARGE SUMMARY    Patient Name: Larissa Cowan  Date: 6/25/2021  Visit #: 8/12  PTA visit #:  NA  Referral Diagnosis: Chronic left shoulder pain  Referring provider: Candis Vee MD  Visit Diagnosis:     ICD-10-CM    1. Decreased range of motion of shoulder, left  M25.612    2. Shoulder weakness  R29.898        Assessment from Initial Evaluation:  Larissa Cowan is a 55 y.o. female who presents to therapy today with chief complaints of left shoulder pain. Onset date of sx began about a year ago, with insidious onset.  Patient reports reduced and painful left shoulder AROM.  The pt's functional limitations as a result of her symptoms include difficulty reaching overhead into a cupboard, UB dressing, washing her hair, carrying groceries/laundry and has impacted her sleep.  Pt demo's signs and sx consistent with adhesive capsulitis as the PT eval reveals a capsular pattern to the pt's shoulder  AROM limitations; additionally, the pt's age and gender make her a likely candidate for this diagnosis.  The pt would benefit from skilled 1:1 PT to address her physical and functional impairments.    No data recorded      Assessment:   DISCHARGE SUMMARY  Pt is a 57 yo female who presented to PT with the chief complaints of left shoulder pain.  PT eval findings found a capsular pattern to the pt's left shoulder making reaching overhead into a cupboard, UB dressing, washing her hair, carrying groceries/laundry  difficult for the pt.  PT treatment focused on shoulder ROM with manual therapy and exercise.  The pt was seen from 2/26/21 to 6/25/21 for a total of 8 sessions.  At the end of therapy, the pt feels like she is able to reach overhead and dress without difficulty.  The pt has met 100% of the goals outlined in the POC and listed below.  The pt is appropriate for DC from skilled 1:1 PT to her [facility] for ongoing symptom management.  The pt verbalizes understanding  and agrees with this plan.        HEP/POC compliance is  fair .  Patient demonstrates understanding/independence with home program.    Goal Status:  Goal Status:  Pt. will demonstrate/verbalize independence in self-management of condition in : 12 weeks-MET  Pt. will be independent with home exercise program in : 12 weeks-MET  Patient will reach / maintain arm movement: overhead;behind;for home chores;for dressing;with full ROM;with less pain;in 12 weeks;with partial ROM--MET     Pt will: demo improved R shoulder external rotation by 45 degrees for greater ease of UB dressing in 12 weeks.--Mostly MET; pt achieved 40 deg ER      Plan / Patient Education:   Discharge to [facility]    Subjective:     Pain Ratin/10 in L shoulder    No significant complaints.  Pt remains happy with her progress.    Objective:     Shoulder A/PROM (21) in degrees, pt seated unless otherwise noted:   Flexion: 165/170 deg   ABDuction: 160/170 deg  External rotation (supine with arm ABDucted to 90 deg): 40/55 deg    Therapeutic Exercise    For aerobic conditioning and topromote active movement in the B shoulders and scapulas to decrease pain:  -NuStep x 10 min, level #5, avg METs: 2.7, SPM: 80    Following exercises to improve shoulder ROM and strength for easier use of ADL's.  Exercise #1: Supine cane/wand exercises for AAROM: flexion and ER x 10 reps ea.-Not performed in session; part of HEP  Comment #1: SL L reach and roll x 10 reps. Pt demos limited ROM  Exercise #2: SL scap sets with L GHJ external rotation and 2#  x 15 reps.  Pt demos limted L ER.  Comment #2: Standing scap retraction with L3 theraband 2 x 15 reps.  Cues to avoid upper trap activation.  Exercise #3: Wall climbs x 10 reps for flexion and ABDuction x 5 reps ea.  Cues for tall posture.  Pt demos limited ROM, ABD>Flexion  Comment #3: Seated  L shld ER stretch with arm ABDucted x 30 sec.  Exercise #4: Wall push ups x 20 reps.  Cues for foot placement.  Comment #4:  .        Treatment Today     TREATMENT MINUTES COMMENTS   Evaluation     Self-care/ Home management     Manual therapy     Neuromuscular Re-education     Therapeutic Activity     Therapeutic Exercises 23 See above   Gait training     Modality__________________                Total 23    Blank areas are intentional and mean the treatment did not include these items.       Yenny Chong, PT, DPT  6/25/2021

## 2021-08-05 ENCOUNTER — OFFICE VISIT (OUTPATIENT)
Dept: FAMILY MEDICINE | Facility: CLINIC | Age: 56
End: 2021-08-05
Payer: COMMERCIAL

## 2021-08-05 VITALS
HEIGHT: 64 IN | WEIGHT: 293 LBS | RESPIRATION RATE: 18 BRPM | BODY MASS INDEX: 50.02 KG/M2 | SYSTOLIC BLOOD PRESSURE: 138 MMHG | HEART RATE: 100 BPM | OXYGEN SATURATION: 98 % | DIASTOLIC BLOOD PRESSURE: 86 MMHG

## 2021-08-05 DIAGNOSIS — H25.9 AGE-RELATED CATARACT OF BOTH EYES, UNSPECIFIED AGE-RELATED CATARACT TYPE: ICD-10-CM

## 2021-08-05 DIAGNOSIS — N18.31 STAGE 3A CHRONIC KIDNEY DISEASE (H): ICD-10-CM

## 2021-08-05 DIAGNOSIS — E11.9 TYPE 2 DIABETES MELLITUS WITHOUT COMPLICATION, WITHOUT LONG-TERM CURRENT USE OF INSULIN (H): ICD-10-CM

## 2021-08-05 DIAGNOSIS — Z01.818 PRE-OP EXAM: Primary | ICD-10-CM

## 2021-08-05 LAB — HBA1C MFR BLD: 9.3 % (ref 0–5.6)

## 2021-08-05 PROCEDURE — 80048 BASIC METABOLIC PNL TOTAL CA: CPT | Performed by: NURSE PRACTITIONER

## 2021-08-05 PROCEDURE — 36415 COLL VENOUS BLD VENIPUNCTURE: CPT | Performed by: NURSE PRACTITIONER

## 2021-08-05 PROCEDURE — 99204 OFFICE O/P NEW MOD 45 MIN: CPT | Performed by: NURSE PRACTITIONER

## 2021-08-05 PROCEDURE — 83036 HEMOGLOBIN GLYCOSYLATED A1C: CPT | Performed by: NURSE PRACTITIONER

## 2021-08-05 RX ORDER — LANOLIN ALCOHOL/MO/W.PET/CERES
1000 CREAM (GRAM) TOPICAL DAILY
COMMUNITY

## 2021-08-05 RX ORDER — ASPIRIN 81 MG/1
81 TABLET ORAL DAILY
COMMUNITY

## 2021-08-05 ASSESSMENT — ASTHMA QUESTIONNAIRES
QUESTION_1 LAST FOUR WEEKS HOW MUCH OF THE TIME DID YOUR ASTHMA KEEP YOU FROM GETTING AS MUCH DONE AT WORK, SCHOOL OR AT HOME: NONE OF THE TIME
QUESTION_4 LAST FOUR WEEKS HOW OFTEN HAVE YOU USED YOUR RESCUE INHALER OR NEBULIZER MEDICATION (SUCH AS ALBUTEROL): NOT AT ALL
ACT_TOTALSCORE: 25
QUESTION_5 LAST FOUR WEEKS HOW WOULD YOU RATE YOUR ASTHMA CONTROL: COMPLETELY CONTROLLED
QUESTION_2 LAST FOUR WEEKS HOW OFTEN HAVE YOU HAD SHORTNESS OF BREATH: NOT AT ALL
QUESTION_3 LAST FOUR WEEKS HOW OFTEN DID YOUR ASTHMA SYMPTOMS (WHEEZING, COUGHING, SHORTNESS OF BREATH, CHEST TIGHTNESS OR PAIN) WAKE YOU UP AT NIGHT OR EARLIER THAN USUAL IN THE MORNING: NOT AT ALL

## 2021-08-05 ASSESSMENT — MIFFLIN-ST. JEOR: SCORE: 1907.33

## 2021-08-05 NOTE — PATIENT INSTRUCTIONS

## 2021-08-05 NOTE — PROGRESS NOTES
[facility]  [address] [place] [address]  Phone: [phone]  Fax: [phone]  Primary Provider: Candis Vee  Pre-op Performing Provider: ELHAM SALINAS      PREOPERATIVE EVALUATION:  Today's date: 8/5/2021    Larissa Cowan is a 56 year old female who presents for a preoperative evaluation.    Surgical Information:  Surgery/Procedure: (R) Cataract  Surgery Location: [facility]  Surgeon: Dr Tran  Surgery Date: 8/9/21  Time of Surgery: 0600  Where patient plans to recover: At home with family  Fax number for surgical facility: [phone]    Type of Anesthesia Anticipated: Local with MAC    1. Pre-op exam  Cleared for procedure    2. Age-related cataract of both eyes, unspecified age-related cataract type    3. Type 2 diabetes mellitus without complication, without long-term current use of insulin (H)  A1C elevated today to 9.3.  Discussed importance of glucose control for optimal healing.  Elevated level does not preclude cataract surgery. Scheduled for follow up with PCP to address in two weeks.  Advised to monitor home blood sugars to assist with making decisions about medication adjustments  - Hemoglobin A1c; Future  - Hemoglobin A1c    4. Stage 3a chronic kidney disease  - Basic metabolic panel  (Ca, Cl, CO2, Creat, Gluc, K, Na, BUN); Future  - Basic metabolic panel  (Ca, Cl, CO2, Creat, Gluc, K, Na, BUN)    Subjective     HPI related to upcoming procedure:     Will be undergoing bilateral cataract surgery, right first.  Patient of Dr. Vee.  History notable for DM2, CKD, morbid obesity.      Diabetes: Most recent A1C 8.6 in March, 9.3 today. Currently on Metformin, Rybelsus 7mg, glimepiride 4mg.  She states she has not checked blood sugar for several weeks.  Prior to that cannot recall what home readings were.     HTN:  Dyazide. Just over goal range today.     Hypothyroidism:  Synthroid 275mcg. TSH in goal range January of this year.      H/o asthma noted in chart.  States she has not had difficulty with breathing for several years.     Preop Questions 8/5/2021   1. Have you ever had a heart attack or stroke? No   2. Have you ever had surgery on your heart or blood vessels, such as a stent placement, a coronary artery bypass, or surgery on an artery in your head, neck, heart, or legs? No   3. Do you have chest pain with activity? No   4. Do you have a history of  heart failure? No   5. Do you currently have a cold, bronchitis or symptoms of other infection? No   6. Do you have a cough, shortness of breath, or wheezing? No   7. Do you or anyone in your family have previous history of blood clots? No   8. Do you or does anyone in your family have a serious bleeding problem such as prolonged bleeding following surgeries or cuts? No   9. Have you ever had problems with anemia or been told to take iron pills? No   10. Have you had any abnormal blood loss such as black, tarry or bloody stools, or abnormal vaginal bleeding? No   11. Have you ever had a blood transfusion? No   12. Are you willing to have a blood transfusion if it is medically needed before, during, or after your surgery? Yes   13. Have you or any of your relatives ever had problems with anesthesia? No   14. Do you have sleep apnea, excessive snoring or daytime drowsiness? No   15. Do you have any artifical heart valves or other implanted medical devices like a pacemaker, defibrillator, or continuous glucose monitor? No   16. Do you have artificial joints? No   17. Are you allergic to latex? No   18. Is there any chance that you may be pregnant? No       Health Care Directive:  Patient does not have a Health Care Directive or Living Will: Discussed advance care planning with patient; information given to patient to review.    Preoperative Review of :   reviewed - no record of controlled substances prescribed.      Status of Chronic Conditions:  See problem list for active medical  problems.  Problems all longstanding and stable, except as noted/documented.  See ROS for pertinent symptoms related to these conditions.      Review of Systems  CONSTITUTIONAL: NEGATIVE for fever, chills, change in weight  ENT/MOUTH: NEGATIVE for ear, mouth and throat problems  RESP: NEGATIVE for significant cough or SOB  CV: NEGATIVE for chest pain, palpitations or peripheral edema    Patient Active Problem List    Diagnosis Date Noted     Chronic kidney disease, stage 3 01/08/2021     Priority: Medium     Asthma      Priority: Medium     Created by Conversion         Morbid obesity (H) 03/21/2019     Priority: Medium     Type 2 diabetes mellitus (H)      Priority: Medium     Created by Conversion         Migraine Headache      Priority: Medium     Created by Conversion  Replacement Utility updated for latest IMO load         Hypothyroidism 03/04/2016     Priority: Medium     Allergy To Fruit      Priority: Medium     Created by Conversion  [employer] [facility] Annotation: Aug 12 2009 10:Candis Luna: callum          Past Medical History:   Diagnosis Date     Migraine      No past surgical history on file.  Current Outpatient Medications   Medication Sig Dispense Refill     albuterol (PROAIR HFA;PROVENTIL HFA;VENTOLIN HFA) 90 mcg/actuation inhaler [ALBUTEROL (PROAIR HFA;PROVENTIL HFA;VENTOLIN HFA) 90 MCG/ACTUATION INHALER] Inhale 2 puffs every 6 (six) hours as needed for wheezing. 1 Inhaler 3     aspirin 81 MG EC tablet Take 81 mg by mouth daily       atorvastatin (LIPITOR) 10 MG tablet [ATORVASTATIN (LIPITOR) 10 MG TABLET] TAKE 1 TABLET DAILY 90 tablet 3     B INFANTIS/B ANI/B AMIE/B BIFID (PROBIOTIC 4X ORAL) [B INFANTIS/B ANI/B AMIE/B BIFID (PROBIOTIC 4X ORAL)] Take by mouth daily.       blood glucose meter (GLUCOMETER) [BLOOD GLUCOSE METER (GLUCOMETER)] Use 1 each As Directed daily. Dispense glucometer brand per patient's insurance at pharmacy discretion. 1 each 0     blood glucose meter (ONETOUCH ULTRAMINI)  [BLOOD GLUCOSE METER (ONETOUCH ULTRAMINI)] Use 1 each As Directed as needed. Dispense glucometer brand per patient's insurance at pharmacy discretion.  0     blood glucose test (CONTOUR NEXT TEST STRIPS) strips [BLOOD GLUCOSE TEST (CONTOUR NEXT TEST STRIPS) STRIPS] Use 1 each As Directed daily. Dispense brand per patient's insurance at pharmacy discretion. 100 strip 3     blood-glucose meter Misc [BLOOD-GLUCOSE METER MISC] Check glucose fasting/ before supper daily 1 each 0     cholecalciferol, vitamin D3, 1,000 unit tablet [CHOLECALCIFEROL, VITAMIN D3, 1,000 UNIT TABLET] Take 1,000 Units by mouth daily.       cranberry 500 mg cap [CRANBERRY 500 MG CAP] Take by mouth daily.       cyanocobalamin (VITAMIN B-12) 1000 MCG tablet Take 1,000 mcg by mouth daily       generic lancets (FINGERSTIX LANCETS) [GENERIC LANCETS (FINGERSTIX LANCETS)] Use 1 each As Directed daily. Dispense brand per patient's insurance at pharmacy discretion. 100 each 3     glimepiride (AMARYL) 2 MG tablet [GLIMEPIRIDE (AMARYL) 2 MG TABLET] Take 2 tablets (4 mg total) by mouth daily. as directed 180 tablet 3     glucosamine-chondroitin 500-400 mg tablet [GLUCOSAMINE-CHONDROITIN 500-400 MG TABLET] Take 1 tablet by mouth daily.       levothyroxine (SYNTHROID) 200 MCG tablet [LEVOTHYROXINE (SYNTHROID) 200 MCG TABLET] TAKE 1 TABLET ONCE DAILY 90 tablet 3     levothyroxine (SYNTHROID) 75 MCG tablet [LEVOTHYROXINE (SYNTHROID) 75 MCG TABLET] TAKE 1 TABLET DAILY IN     ADDITION TO THE 200MCG     TABLET 90 tablet 3     metFORMIN (GLUCOPHAGE) 1000 MG tablet [METFORMIN (GLUCOPHAGE) 1000 MG TABLET] Take 1 tablet (1,000 mg total) by mouth 2 (two) times a day with meals. 180 tablet 4     semaglutide (RYBELSUS) 7 mg tablet [SEMAGLUTIDE (RYBELSUS) 7 MG TABLET] Take 7 mg by mouth daily. 90 tablet 3     SUMAtriptan (IMITREX) 50 MG tablet [SUMATRIPTAN (IMITREX) 50 MG TABLET] TAKE 1 TABLET BY MOUTH  EVERY 2 HOURS AS NEEDED FOR MIGRAINE. MAXIMUM OF 2  TABLETS PER 24  "HOURS 9 tablet 11     traZODone (DESYREL) 50 MG tablet [TRAZODONE (DESYREL) 50 MG TABLET] Take 1-2 tablets ( mg total) by mouth at bedtime. 180 tablet 3     triamterene-hydrochlorothiazide (DYAZIDE) 37.5-25 mg per capsule [TRIAMTERENE-HYDROCHLOROTHIAZIDE (DYAZIDE) 37.5-25 MG PER CAPSULE] Take 1 capsule by mouth daily. 90 capsule 3     lisinopril (PRINIVIL,ZESTRIL) 5 MG tablet [LISINOPRIL (PRINIVIL,ZESTRIL) 5 MG TABLET] TAKE 1 TABLET ONCE DAILY 90 tablet 0     MEDICATION CANNOT BE REORDERED - PLEASE MANUALLY REORDER AND DISCONTINUE THE OLD ORDER [MULTIVITAMIN WITH MINERALS (HAIR,SKIN & NAILS ORAL)] Take 1 capsule by mouth daily.         No Known Allergies     Social History     Tobacco Use     Smoking status: Never Smoker     Smokeless tobacco: Never Used   Substance Use Topics     Alcohol use: Not on file     Family History   Problem Relation Age of Onset     Coronary Artery Disease Father      Depression Mother      Coronary Artery Disease Mother      Hypothyroidism Mother      Osteoarthritis Mother      Graves' disease Sister      Other - See Comments Sister         Lumbar Back pain      History   Drug Use Not on file         Objective     /88 (BP Location: Left arm, Patient Position: Sitting, Cuff Size: Adult Large)   Pulse 100   Resp 18   Ht 1.619 m (5' 3.75\")   Wt 133.6 kg (294 lb 9.6 oz)   SpO2 98%   BMI 50.97 kg/m      Physical Exam  GENERAL APPEARANCE: healthy, alert and no distress  HENT: ear canals and TM's normal and nose and mouth without ulcers or lesions  RESP: lungs clear to auscultation - no rales, rhonchi or wheezes  CV: regular rate and rhythm, normal S1 S2, no S3 or S4 and no murmur, click or rub   ABDOMEN: soft, nontender, no HSM or masses and bowel sounds normal  NEURO: Normal strength and tone, sensory exam grossly normal, mentation intact and speech normal    Recent Labs   Lab Test 03/12/21  0852 01/08/21  0751   HGB  --  13.6   PLT  --  233    140   POTASSIUM 4.6 4.4 "   CR 0.95 1.31*   A1C 8.6* 10.3*        Diagnostics:  Recent Results (from the past 24 hour(s))   Hemoglobin A1c    Collection Time: 08/05/21  5:24 PM   Result Value Ref Range    Hemoglobin A1C 9.3 (H) 0.0 - 5.6 %      No EKG required, no history of coronary heart disease, significant arrhythmia, peripheral arterial disease or other structural heart disease.    Revised Cardiac Risk Index (RCRI):  The patient has the following serious cardiovascular risks for perioperative complications:   - No serious cardiac risks = 0 points     RCRI Interpretation: 0 points: Class I (very low risk - 0.4% complication rate)           Signed Electronically by: Brittany Cano CNP  Copy of this evaluation report is provided to requesting physician.

## 2021-08-06 LAB
ANION GAP SERPL CALCULATED.3IONS-SCNC: 15 MMOL/L (ref 5–18)
BUN SERPL-MCNC: 19 MG/DL (ref 8–22)
CALCIUM SERPL-MCNC: 10.3 MG/DL (ref 8.5–10.5)
CHLORIDE BLD-SCNC: 100 MMOL/L (ref 98–107)
CO2 SERPL-SCNC: 24 MMOL/L (ref 22–31)
CREAT SERPL-MCNC: 1.23 MG/DL (ref 0.6–1.1)
GFR SERPL CREATININE-BSD FRML MDRD: 49 ML/MIN/1.73M2
GLUCOSE BLD-MCNC: 167 MG/DL (ref 70–125)
POTASSIUM BLD-SCNC: 3.4 MMOL/L (ref 3.5–5)
SODIUM SERPL-SCNC: 139 MMOL/L (ref 136–145)

## 2021-08-06 ASSESSMENT — ASTHMA QUESTIONNAIRES: ACT_TOTALSCORE: 25

## 2021-09-07 ENCOUNTER — OFFICE VISIT (OUTPATIENT)
Dept: INTERNAL MEDICINE | Facility: CLINIC | Age: 56
End: 2021-09-07
Payer: COMMERCIAL

## 2021-09-07 VITALS
OXYGEN SATURATION: 99 % | BODY MASS INDEX: 48.83 KG/M2 | HEART RATE: 102 BPM | DIASTOLIC BLOOD PRESSURE: 78 MMHG | SYSTOLIC BLOOD PRESSURE: 126 MMHG | WEIGHT: 286 LBS | HEIGHT: 64 IN

## 2021-09-07 DIAGNOSIS — E03.9 ACQUIRED HYPOTHYROIDISM: ICD-10-CM

## 2021-09-07 DIAGNOSIS — E11.65 TYPE 2 DIABETES MELLITUS WITH HYPERGLYCEMIA, WITHOUT LONG-TERM CURRENT USE OF INSULIN (H): ICD-10-CM

## 2021-09-07 DIAGNOSIS — N18.31 STAGE 3A CHRONIC KIDNEY DISEASE (H): ICD-10-CM

## 2021-09-07 DIAGNOSIS — Z01.818 PREOP EXAM FOR INTERNAL MEDICINE: Primary | ICD-10-CM

## 2021-09-07 LAB
ALBUMIN SERPL-MCNC: 3.7 G/DL (ref 3.5–5)
ALP SERPL-CCNC: 91 U/L (ref 45–120)
ALT SERPL W P-5'-P-CCNC: 18 U/L (ref 0–45)
ANION GAP SERPL CALCULATED.3IONS-SCNC: 16 MMOL/L (ref 5–18)
AST SERPL W P-5'-P-CCNC: 13 U/L (ref 0–40)
BILIRUB SERPL-MCNC: 0.9 MG/DL (ref 0–1)
BUN SERPL-MCNC: 19 MG/DL (ref 8–22)
CALCIUM SERPL-MCNC: 10.2 MG/DL (ref 8.5–10.5)
CHLORIDE BLD-SCNC: 101 MMOL/L (ref 98–107)
CHOLEST SERPL-MCNC: 126 MG/DL
CO2 SERPL-SCNC: 23 MMOL/L (ref 22–31)
CREAT SERPL-MCNC: 1.13 MG/DL (ref 0.6–1.1)
ERYTHROCYTE [DISTWIDTH] IN BLOOD BY AUTOMATED COUNT: 15.4 % (ref 10–15)
FASTING STATUS PATIENT QL REPORTED: YES
GFR SERPL CREATININE-BSD FRML MDRD: 54 ML/MIN/1.73M2
GLUCOSE BLD-MCNC: 172 MG/DL (ref 70–125)
HCT VFR BLD AUTO: 40.4 % (ref 35–47)
HDLC SERPL-MCNC: 35 MG/DL
HGB BLD-MCNC: 12.9 G/DL (ref 11.7–15.7)
LDLC SERPL CALC-MCNC: 56 MG/DL
MCH RBC QN AUTO: 27.4 PG (ref 26.5–33)
MCHC RBC AUTO-ENTMCNC: 31.9 G/DL (ref 31.5–36.5)
MCV RBC AUTO: 86 FL (ref 78–100)
PLATELET # BLD AUTO: 282 10E3/UL (ref 150–450)
POTASSIUM BLD-SCNC: 3.5 MMOL/L (ref 3.5–5)
PROT SERPL-MCNC: 7.6 G/DL (ref 6–8)
RBC # BLD AUTO: 4.7 10E6/UL (ref 3.8–5.2)
SODIUM SERPL-SCNC: 140 MMOL/L (ref 136–145)
T4 FREE SERPL-MCNC: 1.89 NG/DL (ref 0.7–1.8)
TRIGL SERPL-MCNC: 175 MG/DL
TSH SERPL DL<=0.005 MIU/L-ACNC: 0.08 UIU/ML (ref 0.3–5)
WBC # BLD AUTO: 9.1 10E3/UL (ref 4–11)

## 2021-09-07 PROCEDURE — 80053 COMPREHEN METABOLIC PANEL: CPT | Performed by: INTERNAL MEDICINE

## 2021-09-07 PROCEDURE — 80061 LIPID PANEL: CPT | Performed by: INTERNAL MEDICINE

## 2021-09-07 PROCEDURE — 99214 OFFICE O/P EST MOD 30 MIN: CPT | Performed by: INTERNAL MEDICINE

## 2021-09-07 PROCEDURE — 36415 COLL VENOUS BLD VENIPUNCTURE: CPT | Performed by: INTERNAL MEDICINE

## 2021-09-07 PROCEDURE — 84439 ASSAY OF FREE THYROXINE: CPT | Performed by: INTERNAL MEDICINE

## 2021-09-07 PROCEDURE — 84443 ASSAY THYROID STIM HORMONE: CPT | Performed by: INTERNAL MEDICINE

## 2021-09-07 PROCEDURE — 82306 VITAMIN D 25 HYDROXY: CPT | Performed by: INTERNAL MEDICINE

## 2021-09-07 PROCEDURE — 85027 COMPLETE CBC AUTOMATED: CPT | Performed by: INTERNAL MEDICINE

## 2021-09-07 RX ORDER — MULTIVITAMIN WITH IRON
1 TABLET ORAL DAILY
COMMUNITY

## 2021-09-07 ASSESSMENT — MIFFLIN-ST. JEOR: SCORE: 1868.32

## 2021-09-07 NOTE — PROGRESS NOTES
[facility]  [address] [place] [address]  Phone: [phone]  Fax: [phone]  Primary Provider: Candis Vee  :[id number]}  PREOPERATIVE EVALUATION:  Today's date: 9/7/2021    Larissa Cowan is a 56 year old female who presents for a preoperative evaluation.    Surgical Information:  Surgery/Procedure: Cataract Lt  Surgery Location: [facility]   Surgeon: Dr Tran   Surgery Date: 9/13/21  Where patient plans to recover: At home with family  Fax number for surgical facility:     Type of Anesthesia Anticipated: Local with light sedation    Assessment/Plan:     1.  Encounter for preoperative evaluation  Larissa is medically stable for anesthesia prior to cataract surgery.  She had a previous cataract surgery and tolerated anesthesia without any difficulty.  Currently, she is free from symptoms of an infectious disease or cardiovascular nature.    Medications are reviewed.  She will be n.p.o. on the a.m. of surgery and take her medications upon completion thereof.    Aftercare will be provided by friends.    - Comprehensive metabolic panel; Future  - CBC with platelets; Future  - Lipid panel reflex to direct LDL Fasting; Future    2. Type 2 diabetes mellitus with hyperglycemia, without long-term current use of insulin (H)  With recent suboptimal control.  Over the last few days, however, she has gotten back on track with compliance with medications.  Fasting blood sugar yesterday and today under 120.  Weight down and blood pressure improved.  Recommendation: Encourage strong compliance with diabetes to ensure adequate healing.  She needs to return in 2 months for recheck on labs.  Have discussed the importance of taking her medications daily.  May need to switch Metformin to extended release to follow work schedule.  - Comprehensive metabolic panel; Future  - CBC with platelets; Future  - Lipid panel reflex to direct LDL Fasting; Future    3.  Acquired hypothyroidism  Due for an update on labs  - TSH with free T4 reflex; Future    4. Stage 3a chronic kidney disease  Encourage fluids  - Vitamin D Deficiency; Future        There are no Patient Instructions on file for this visit.    Subjective     HPI related to upcoming procedure: Larissa is a delightful 56-year-old female with suboptimal control of type 2 diabetes and obesity.  She is here today for preoperative evaluation prior to anesthesia for cataract surgical procedure.  Her last procedure went quite well in August.  She is eagerly anticipating the dramatic improvement of vision that she experienced with the last surgery.    Currently, she denies any symptoms of an infectious disease nature such as chest pain, shortness of breath or any new bowel or bladder issues.    Pertinent anesthesia history is reviewed: She has tolerated the anesthesia use for cataract surgery quite well and had no untoward side effects.  Of note, her history is negative for primary coronary artery disease, but she does have type 2 diabetes-suboptimal control, elevated blood pressure and a chronic kidney disease.  She is somewhat sedentary.  She is a non-smoker.    Preop Questions 9/7/2021   1. Have you ever had a heart attack or stroke? No   2. Have you ever had surgery on your heart or blood vessels, such as a stent placement, a coronary artery bypass, or surgery on an artery in your head, neck, heart, or legs? No   3. Do you have chest pain with activity? No   4. Do you have a history of  heart failure? No   5. Do you currently have a cold, bronchitis or symptoms of other infection? No   6. Do you have a cough, shortness of breath, or wheezing? No   7. Do you or anyone in your family have previous history of blood clots? No   8. Do you or does anyone in your family have a serious bleeding problem such as prolonged bleeding following surgeries or cuts? No   9. Have you ever had problems with anemia or been told to take iron  pills? No   10. Have you had any abnormal blood loss such as black, tarry or bloody stools, or abnormal vaginal bleeding? No   11. Have you ever had a blood transfusion? No   12. Are you willing to have a blood transfusion if it is medically needed before, during, or after your surgery? Yes   13. Have you or any of your relatives ever had problems with anesthesia? No   14. Do you have sleep apnea, excessive snoring or daytime drowsiness? No   15. Do you have any artifical heart valves or other implanted medical devices like a pacemaker, defibrillator, or continuous glucose monitor? No   16. Do you have artificial joints? No   17. Are you allergic to latex? No   18. Is there any chance that you may be pregnant? No       Health Care Directive:  Patient does not have a Health Care Directive or Living Will:     Preoperative Review of :   reviewed - no record of controlled substances prescribed.    Patient Active Problem List    Diagnosis Date Noted     Chronic kidney disease, stage 3 01/08/2021     Priority: Medium     Asthma      Priority: Medium     Created by Conversion         Morbid obesity (H) 03/21/2019     Priority: Medium     Type 2 diabetes mellitus (H)      Priority: Medium     Created by Conversion         Migraine Headache      Priority: Medium     Created by Conversion  Replacement Utility updated for latest IMO load         Hypothyroidism 03/04/2016     Priority: Medium     Allergy To Fruit      Priority: Medium     Created by Conversion  [facility] Annotation: Aug 12 2009 10:33Candis Wallace: callum          Past Medical History:   Diagnosis Date     Migraine      No past surgical history on file.  Current Outpatient Medications   Medication Sig Dispense Refill     albuterol (PROAIR HFA;PROVENTIL HFA;VENTOLIN HFA) 90 mcg/actuation inhaler [ALBUTEROL (PROAIR HFA;PROVENTIL HFA;VENTOLIN HFA) 90 MCG/ACTUATION INHALER] Inhale 2 puffs every 6 (six) hours as needed for wheezing. 1 Inhaler 3      aspirin 81 MG EC tablet Take 81 mg by mouth daily       atorvastatin (LIPITOR) 10 MG tablet [ATORVASTATIN (LIPITOR) 10 MG TABLET] TAKE 1 TABLET DAILY 90 tablet 3     B INFANTIS/B ANI/B AMIE/B BIFID (PROBIOTIC 4X ORAL) [B INFANTIS/B ANI/B AMIE/B BIFID (PROBIOTIC 4X ORAL)] Take by mouth daily.       blood glucose test (CONTOUR NEXT TEST STRIPS) strips [BLOOD GLUCOSE TEST (CONTOUR NEXT TEST STRIPS) STRIPS] Use 1 each As Directed daily. Dispense brand per patient's insurance at pharmacy discretion. 100 strip 3     cholecalciferol, vitamin D3, 1,000 unit tablet [CHOLECALCIFEROL, VITAMIN D3, 1,000 UNIT TABLET] Take 1,000 Units by mouth daily.       cranberry 500 mg cap [CRANBERRY 500 MG CAP] Take by mouth daily.       cyanocobalamin (VITAMIN B-12) 1000 MCG tablet Take 1,000 mcg by mouth daily       generic lancets (FINGERSTIX LANCETS) [GENERIC LANCETS (FINGERSTIX LANCETS)] Use 1 each As Directed daily. Dispense brand per patient's insurance at pharmacy discretion. 100 each 3     glimepiride (AMARYL) 2 MG tablet [GLIMEPIRIDE (AMARYL) 2 MG TABLET] Take 2 tablets (4 mg total) by mouth daily. as directed 180 tablet 3     glucosamine-chondroitin 500-400 mg tablet [GLUCOSAMINE-CHONDROITIN 500-400 MG TABLET] Take 1 tablet by mouth daily.       levothyroxine (SYNTHROID) 200 MCG tablet [LEVOTHYROXINE (SYNTHROID) 200 MCG TABLET] TAKE 1 TABLET ONCE DAILY 90 tablet 3     levothyroxine (SYNTHROID) 75 MCG tablet [LEVOTHYROXINE (SYNTHROID) 75 MCG TABLET] TAKE 1 TABLET DAILY IN     ADDITION TO THE 200MCG     TABLET 90 tablet 3     magnesium 250 MG tablet Take 1 tablet by mouth daily       metFORMIN (GLUCOPHAGE) 1000 MG tablet [METFORMIN (GLUCOPHAGE) 1000 MG TABLET] Take 1 tablet (1,000 mg total) by mouth 2 (two) times a day with meals. 180 tablet 4     semaglutide (RYBELSUS) 7 mg tablet [SEMAGLUTIDE (RYBELSUS) 7 MG TABLET] Take 7 mg by mouth daily. 90 tablet 3     SUMAtriptan (IMITREX) 50 MG tablet [SUMATRIPTAN (IMITREX) 50 MG TABLET] TAKE  1 TABLET BY MOUTH  EVERY 2 HOURS AS NEEDED FOR MIGRAINE. MAXIMUM OF 2  TABLETS PER 24 HOURS 9 tablet 11     traZODone (DESYREL) 50 MG tablet [TRAZODONE (DESYREL) 50 MG TABLET] Take 1-2 tablets ( mg total) by mouth at bedtime. 180 tablet 3     triamterene-hydrochlorothiazide (DYAZIDE) 37.5-25 mg per capsule [TRIAMTERENE-HYDROCHLOROTHIAZIDE (DYAZIDE) 37.5-25 MG PER CAPSULE] Take 1 capsule by mouth daily. 90 capsule 3     blood glucose meter (GLUCOMETER) [BLOOD GLUCOSE METER (GLUCOMETER)] Use 1 each As Directed daily. Dispense glucometer brand per patient's insurance at pharmacy discretion. 1 each 0     blood glucose meter (ONETOUCH ULTRAMINI) [BLOOD GLUCOSE METER (ONETOUCH ULTRAMINI)] Use 1 each As Directed as needed. Dispense glucometer brand per patient's insurance at pharmacy discretion.  0     blood-glucose meter Misc [BLOOD-GLUCOSE METER MISC] Check glucose fasting/ before supper daily 1 each 0       Allergies   Allergen Reactions     Banana Other (See Comments)     When slicing bananas     Kiwi Swelling     Lips swell        Social History     Tobacco Use     Smoking status: Never Smoker     Smokeless tobacco: Never Used   Substance Use Topics     Alcohol use: Not on file     History   Drug Use Not on file         Objective     There were no vitals taken for this visit.    Physical Exam  EYES: Eyelids, conjunctiva, and sclera were normal. Pupils were normal. Cornea, iris, and lens were normal bilaterally.  HEAD, EARS, NOSE, MOUTH, AND THROAT: Head and face were normal. Hearing was normal to voice and the ears were normal to external exam. Nose appearance was normal and there was no discharge. Oropharynx was normal.  TMs were normal.  NECK: Neck appearance was normal. There were no neck masses and the thyroid was not enlarged.  RESPIRATORY: Breathing pattern was normal and the chest moved symmetrically.   Lung sounds were equal bilaterally.  CARDIOVASCULAR: Heart rate and rhythm were normal.  S1 and S2 were  normal and there were no extra sounds or murmurs. Peripheral pulses in arms and legs were normal.  Jugular venous pressure was normal.  There was no peripheral edema.  GASTROINTESTINAL: The abdomen was normal in contour.  Bowel sounds were present.   Palpation detected no tenderness, mass, or enlarged organs.   MUSCULOSKELETAL: Skeletal configuration was normal and muscle mass was normal for age. Joint appearance was overall normal.  LYMPHATIC: There were no enlarged nodes.  SKIN/HAIR/NAILS: Skin color was normal.  There were no abnormal skin lesions.  Hair and nails were normal.  NEUROLOGIC: The patient was alert and oriented to person, place, time, and circumstance. Speech was normal. Cranial nerves were normal. Motor strength was normal for age. The patient was normally coordinated.  PSYCHIATRIC:  Mood and affect were normal and the patient had normal recent and remote memory. The patient's judgment and insight were normal.      Laboratory studies pending    Recent Labs   Lab Test 08/05/21  1724 03/12/21  0852 01/08/21  0751   HGB  --   --  13.6   PLT  --   --  233    140 140   POTASSIUM 3.4* 4.6 4.4   CR 1.23* 0.95 1.31*   A1C 9.3* 8.6* 10.3*        Diagnostics:  Labs pending    Revised Cardiac Risk Index (RCRI):  The patient has the following serious cardiovascular risks for perioperative complications:   - Diabetes Mellitus (on Insulin) = 1 point     RCRI Interpretation: 1 point: Class II (low risk - 0.9% complication rate)          The visit lasted a total of 35 minutes          Signed Electronically by: Candis Vee MD

## 2021-09-08 LAB — DEPRECATED CALCIDIOL+CALCIFEROL SERPL-MC: 40 UG/L (ref 30–80)

## 2021-10-17 ENCOUNTER — HEALTH MAINTENANCE LETTER (OUTPATIENT)
Age: 56
End: 2021-10-17

## 2021-10-31 DIAGNOSIS — E11.9 DIABETES (H): ICD-10-CM

## 2021-11-01 DIAGNOSIS — G47.00 INSOMNIA: ICD-10-CM

## 2021-11-01 DIAGNOSIS — E11.9 TYPE 2 DIABETES MELLITUS (H): ICD-10-CM

## 2021-11-01 DIAGNOSIS — I10 HYPERTENSION: ICD-10-CM

## 2021-11-01 RX ORDER — TRIAMTERENE AND HYDROCHLOROTHIAZIDE 37.5; 25 MG/1; MG/1
1 CAPSULE ORAL DAILY
Qty: 90 CAPSULE | Refills: 3 | Status: SHIPPED | OUTPATIENT
Start: 2021-11-01 | End: 2022-10-14

## 2021-11-01 RX ORDER — ATORVASTATIN CALCIUM 10 MG/1
TABLET, FILM COATED ORAL
Qty: 90 TABLET | Refills: 3 | Status: SHIPPED | OUTPATIENT
Start: 2021-11-01 | End: 2022-10-14

## 2021-11-01 RX ORDER — TRAZODONE HYDROCHLORIDE 50 MG/1
50-100 TABLET, FILM COATED ORAL AT BEDTIME
Qty: 180 TABLET | Refills: 3 | Status: SHIPPED | OUTPATIENT
Start: 2021-11-01 | End: 2022-10-14

## 2021-11-01 NOTE — TELEPHONE ENCOUNTER
Reason for Call:  Medication or medication refill:    Do you use a [facility]?  Name of the pharmacy and phone number for the current request:  [email or web address]    Name of the medication requested:     traZODone (DESYREL) 50 MG tablet 180 tablet 3 11/13/2020  No   Sig - Route: [TRAZODONE (DESYREL) 50 MG TABLET] Take 1-2 tablets ( mg total) by mouth at bedtime. - Oral     atorvastatin (LIPITOR) 10 MG tablet 90 tablet 3 11/13/2020  No   Sig: [ATORVASTATIN (LIPITOR) 10 MG TABLET] TAKE 1 TABLET DAILY     triamterene-hydrochlorothiazide (DYAZIDE) 37.5-25 mg per capsule 90 capsule 3 11/13/2020  No   Sig - Route: [TRIAMTERENE-HYDROCHLOROTHIAZIDE (DYAZIDE) 37.5-25 MG PER CAPSULE] Take 1 capsule by mouth daily. - Oral   Class: Normal   Order: [id number]           Other request: pharm states they faxed 2 times.and they had incorrect fax number.. updated fax with pharm    Can we leave a detailed message on this number? YES        Call taken on 11/1/2021 at 9:08 AM by Pam J. Behr

## 2021-11-01 NOTE — TELEPHONE ENCOUNTER
"Last Written Prescription Date:  9/19/20  Last Fill Quantity: 180,  # refills: 4   Last office visit provider:  9/7/21     Requested Prescriptions   Pending Prescriptions Disp Refills     metFORMIN (GLUCOPHAGE) 1000 MG tablet [Pharmacy Med Name: METFORMIN TAB 1000MG] 180 tablet 3     Sig: TAKE 1 TABLET TWICE DAILY  WITH MEALS       Biguanide Agents Passed - 10/31/2021  6:36 PM        Passed - Patient is age 10 or older        Passed - Patient has documented A1c within the specified period of time.     If HgbA1C is 8 or greater, it needs to be on file within the past 3 months.  If less than 8, must be on file within the past 6 months.     Recent Labs   Lab Test 08/05/21  1724   A1C 9.3*             Passed - Patient's CR is NOT>1.4 OR Patient's EGFR is NOT<45 within past 12 mos.     Recent Labs   Lab Test 09/07/21  0903 08/05/21  1724 03/12/21  0852   GFRESTIMATED 54*   < > >60   GFRESTBLACK  --   --  >60    < > = values in this interval not displayed.       Recent Labs   Lab Test 09/07/21  0903   CR 1.13*             Passed - Patient does NOT have a diagnosis of CHF.        Passed - Medication is active on med list        Passed - Patient is not pregnant        Passed - Patient has not had a positive pregnancy test within the past 12 mos.         Passed - Recent (6 mo) or future (30 days) visit within the authorizing provider's specialty     Patient had office visit in the last 6 months or has a visit in the next 30 days with authorizing provider or within the authorizing provider's specialty.  See \"Patient Info\" tab in inbasket, or \"Choose Columns\" in Meds & Orders section of the refill encounter.                 Sarahy Jones RN 11/01/21 4:20 PM  "

## 2021-11-01 NOTE — TELEPHONE ENCOUNTER
Pending Prescriptions:                       Disp   Refills    atorvastatin (LIPITOR) 10 MG tablet       90 tab*3            Sig: [ATORVASTATIN (LIPITOR) 10 MG TABLET] TAKE 1           TABLET DAILY    triamterene-HCTZ (DYAZIDE) 37.5-25 MG cap*90 cap*3            Sig: Take 1 capsule by mouth daily    traZODone (DESYREL) 50 MG tablet          180 ta*3            Sig: Take 1-2 tablets ( mg) by mouth At Bedtime    Last blood pressure reading at our clinic had a value of 138/88.  Lipid panel done 9/7/21 with elevated triglycerides.

## 2022-01-07 DIAGNOSIS — E11.9 TYPE 2 DIABETES MELLITUS (H): ICD-10-CM

## 2022-01-10 RX ORDER — GLIMEPIRIDE 2 MG/1
TABLET ORAL
Qty: 180 TABLET | Refills: 3 | Status: SHIPPED | OUTPATIENT
Start: 2022-01-10 | End: 2022-12-15

## 2022-01-10 NOTE — TELEPHONE ENCOUNTER
"Routing refill request to provider for review/approval because:  Labs out of range:  creatinine  Labs not current:  A1C    Last Written Prescription Date:  1/25/2021  Last Fill Quantity: 180,  # refills: 3   Last office visit provider:  9/7/2021 Dr. Vee     glimepiride (AMARYL) 2 MG tablet 180 tablet 3 1/25/2021  No   Sig - Route: Take 2 tablets (4 mg total) by mouth daily. as directed - Oral   Sent to pharmacy as: glimepiride 2 mg tablet (AMARYL)   E-Prescribing Status: Receipt confirmed by pharmacy (1/25/2021  9:02 AM CST         Requested Prescriptions   Pending Prescriptions Disp Refills     glimepiride (AMARYL) 2 MG tablet [Pharmacy Med Name: GLIMEPIRIDE  TAB 2MG] 180 tablet 3     Sig: TAKE 2 TABLETS DAILY AS    DIRECTED       Sulfonylurea Agents Failed - 1/7/2022  1:17 AM        Failed - Patient has documented A1c within the specified period of time.     If HgbA1C is 8 or greater, it needs to be on file within the past 3 months.  If less than 8, must be on file within the past 6 months.     Recent Labs   Lab Test 08/05/21  1724   A1C 9.3*             Failed - Patient has a recent creatinine (normal) within the past 12 mos.     Recent Labs   Lab Test 09/07/21  0903   CR 1.13*       Ok to refill medication if creatinine is low          Passed - Medication is active on med list        Passed - Patient is age 18 or older        Passed - No active pregnancy on record        Passed - Patient has not had a positive pregnancy test within the past 12 mos.        Passed - Recent (6 mo) or future (30 days) visit within the authorizing provider's specialty     Patient had office visit in the last 6 months or has a visit in the next 30 days with authorizing provider or within the authorizing provider's specialty.  See \"Patient Info\" tab in inbasket, or \"Choose Columns\" in Meds & Orders section of the refill encounter.                 Tracie De León RN 01/09/22 11:21 PM  "

## 2022-01-24 DIAGNOSIS — E03.9 ACQUIRED HYPOTHYROIDISM: ICD-10-CM

## 2022-01-26 RX ORDER — LEVOTHYROXINE SODIUM 75 UG/1
TABLET ORAL
Qty: 90 TABLET | Refills: 3 | Status: SHIPPED | OUTPATIENT
Start: 2022-01-26 | End: 2023-04-04

## 2022-01-26 NOTE — TELEPHONE ENCOUNTER
"Outpatient Medication Detail     Disp Refills Start End TASIA   levothyroxine (SYNTHROID/LEVOTHROID) 75 MCG tablet 90 tablet 3 9/7/2021  No   Sig: Every other day   Class: No Print Out   Order: [id number]     CLASS:  No print.  Routing to provider per protocol    Last Written Prescription Date:  2/3/21  Last Fill Quantity: 90,  # refills: 3   Last office visit provider:  9/7/21     Requested Prescriptions   Pending Prescriptions Disp Refills     levothyroxine (SYNTHROID) 75 MCG tablet [Pharmacy Med Name: SYNTHROID TAB 75MCG] 90 tablet 3     Sig: TAKE 1 TABLET DAILY IN     ADDITION TO THE 200MCG     TABLET       Thyroid Protocol Failed - 1/24/2022  7:16 PM        Failed - Normal TSH on file in past 12 months     Recent Labs   Lab Test 09/07/21  0903   TSH 0.08*              Passed - Patient is 12 years or older        Passed - Recent (12 mo) or future (30 days) visit within the authorizing provider's specialty     Patient has had an office visit with the authorizing provider or a provider within the authorizing providers department within the previous 12 mos or has a future within next 30 days. See \"Patient Info\" tab in inbasket, or \"Choose Columns\" in Meds & Orders section of the refill encounter.              Passed - Medication is active on med list        Passed - No active pregnancy on record     If patient is pregnant or has had a positive pregnancy test, please check TSH.          Passed - No positive pregnancy test in past 12 months     If patient is pregnant or has had a positive pregnancy test, please check TSH.               Yonatan Chamorro RN 01/26/22 12:32 PM  "

## 2022-02-06 ENCOUNTER — HEALTH MAINTENANCE LETTER (OUTPATIENT)
Age: 57
End: 2022-02-06

## 2022-04-02 ENCOUNTER — TRANSFERRED RECORDS (OUTPATIENT)
Dept: MULTI SPECIALTY CLINIC | Facility: CLINIC | Age: 57
End: 2022-04-02
Payer: COMMERCIAL

## 2022-04-27 ENCOUNTER — TELEPHONE (OUTPATIENT)
Dept: INTERNAL MEDICINE | Facility: CLINIC | Age: 57
End: 2022-04-27
Payer: COMMERCIAL

## 2022-04-27 NOTE — TELEPHONE ENCOUNTER
----- Message from Larissa Porter sent at 4/25/2022  2:47 PM CDT -----  Please help schedule pt for DM follow up with pcp Microalbumin needed

## 2022-05-10 DIAGNOSIS — E03.9 HYPOTHYROIDISM: ICD-10-CM

## 2022-05-12 RX ORDER — LEVOTHYROXINE SODIUM 200 MCG
TABLET ORAL
Qty: 90 TABLET | Refills: 1 | Status: SHIPPED | OUTPATIENT
Start: 2022-05-12 | End: 2023-06-28

## 2022-05-12 NOTE — TELEPHONE ENCOUNTER
"Routing refill request to provider for review/approval because:  Labs out of range:  TSH    Last Written Prescription Date:  4/5/21  Last Fill Quantity: 90,  # refills: 3   Last office visit provider:  9/7/21     Requested Prescriptions   Pending Prescriptions Disp Refills     SYNTHROID 200 MCG tablet [Pharmacy Med Name: SYNTHROID TAB 200MCG] 90 tablet 3     Sig: TAKE 1 TABLET ONCE DAILY       Thyroid Protocol Failed - 5/12/2022  7:56 AM        Failed - Normal TSH on file in past 12 months     Recent Labs   Lab Test 09/07/21  0903   TSH 0.08*              Passed - Patient is 12 years or older        Passed - Recent (12 mo) or future (30 days) visit within the authorizing provider's specialty     Patient has had an office visit with the authorizing provider or a provider within the authorizing providers department within the previous 12 mos or has a future within next 30 days. See \"Patient Info\" tab in inbasket, or \"Choose Columns\" in Meds & Orders section of the refill encounter.              Passed - Medication is active on med list        Passed - No active pregnancy on record     If patient is pregnant or has had a positive pregnancy test, please check TSH.          Passed - No positive pregnancy test in past 12 months     If patient is pregnant or has had a positive pregnancy test, please check TSH.               Yonatan Chamorro RN 05/12/22 7:56 AM    "

## 2022-07-24 ENCOUNTER — HEALTH MAINTENANCE LETTER (OUTPATIENT)
Age: 57
End: 2022-07-24

## 2022-10-02 ENCOUNTER — HEALTH MAINTENANCE LETTER (OUTPATIENT)
Age: 57
End: 2022-10-02

## 2022-10-09 DIAGNOSIS — E11.9 DIABETES (H): ICD-10-CM

## 2022-10-10 NOTE — TELEPHONE ENCOUNTER
"Routing refill request to provider for review/approval because:  Labs not current:  Multiple  Patient needs to be seen because it has been more than 1 year since last office visit.    Last Written Prescription Date:  11/1/21  Last Fill Quantity: 180,  # refills: 3   Last office visit provider:  9/7/21     Requested Prescriptions   Pending Prescriptions Disp Refills     metFORMIN (GLUCOPHAGE) 1000 MG tablet [Pharmacy Med Name: METFORMIN TAB 1000MG] 180 tablet 3     Sig: TAKE 1 TABLET TWICE DAILY  WITH MEALS       Biguanide Agents Failed - 10/10/2022 10:41 AM        Failed - Patient has documented A1c within the specified period of time.     If HgbA1C is 8 or greater, it needs to be on file within the past 3 months.  If less than 8, must be on file within the past 6 months.     Recent Labs   Lab Test 08/05/21  1724   A1C 9.3*             Failed - Patient's CR is NOT>1.4 OR Patient's EGFR is NOT<45 within past 12 mos.     Recent Labs   Lab Test 09/07/21  0903 08/05/21  1724 03/12/21  0852   GFRESTIMATED 54*   < > >60   GFRESTBLACK  --   --  >60    < > = values in this interval not displayed.       Recent Labs   Lab Test 09/07/21  0903   CR 1.13*             Failed - Recent (6 mo) or future (30 days) visit within the authorizing provider's specialty     Patient had office visit in the last 6 months or has a visit in the next 30 days with authorizing provider or within the authorizing provider's specialty.  See \"Patient Info\" tab in inbasket, or \"Choose Columns\" in Meds & Orders section of the refill encounter.            Passed - Patient is age 10 or older        Passed - Patient does NOT have a diagnosis of CHF.        Passed - Medication is active on med list        Passed - Patient is not pregnant        Passed - Patient has not had a positive pregnancy test within the past 12 mos.              Yonatan Chamorro RN 10/10/22 10:41 AM  "

## 2022-12-14 DIAGNOSIS — E11.9 TYPE 2 DIABETES MELLITUS (H): ICD-10-CM

## 2022-12-15 RX ORDER — GLIMEPIRIDE 2 MG/1
TABLET ORAL
Qty: 180 TABLET | Refills: 3 | Status: SHIPPED | OUTPATIENT
Start: 2022-12-15

## 2022-12-15 NOTE — TELEPHONE ENCOUNTER
"Routing refill request to provider for review/approval because:  Labs not current:  A1c and creatinine and Patient needs to be seen    Last Written Prescription Date:  1/10/2022  Last Fill Quantity: 180,  # refills: 3   Last office visit provider:  9/7/2021     Requested Prescriptions   Pending Prescriptions Disp Refills     glimepiride (AMARYL) 2 MG tablet [Pharmacy Med Name: GLIMEPIRIDE  TAB 2MG] 180 tablet 3     Sig: TAKE 2 TABLETS DAILY AS    DIRECTED       Sulfonylurea Agents Failed - 12/14/2022  7:34 PM        Failed - Patient has documented A1c within the specified period of time.     If HgbA1C is 8 or greater, it needs to be on file within the past 3 months.  If less than 8, must be on file within the past 6 months.     Recent Labs   Lab Test 08/05/21  1724   A1C 9.3*             Failed - Patient has a recent creatinine (normal) within the past 12 mos.     Recent Labs   Lab Test 09/07/21  0903   CR 1.13*       Ok to refill medication if creatinine is low          Failed - Recent (6 mo) or future (30 days) visit within the authorizing provider's specialty     Patient had office visit in the last 6 months or has a visit in the next 30 days with authorizing provider or within the authorizing provider's specialty.  See \"Patient Info\" tab in inbasket, or \"Choose Columns\" in Meds & Orders section of the refill encounter.            Passed - Medication is active on med list        Passed - Patient is age 18 or older        Passed - No active pregnancy on record        Passed - Patient has not had a positive pregnancy test within the past 12 mos.             Dori Fajardo RN 12/15/22 4:16 PM  "

## 2023-02-09 ENCOUNTER — MYC MEDICAL ADVICE (OUTPATIENT)
Dept: FAMILY MEDICINE | Facility: CLINIC | Age: 58
End: 2023-02-09
Payer: COMMERCIAL

## 2023-02-09 ENCOUNTER — TELEPHONE (OUTPATIENT)
Dept: FAMILY MEDICINE | Facility: CLINIC | Age: 58
End: 2023-02-09
Payer: COMMERCIAL

## 2023-02-09 DIAGNOSIS — E11.9 TYPE 2 DIABETES MELLITUS WITHOUT COMPLICATION, WITHOUT LONG-TERM CURRENT USE OF INSULIN (H): ICD-10-CM

## 2023-02-09 DIAGNOSIS — F51.01 PRIMARY INSOMNIA: ICD-10-CM

## 2023-02-09 DIAGNOSIS — I10 PRIMARY HYPERTENSION: ICD-10-CM

## 2023-02-09 NOTE — TELEPHONE ENCOUNTER
Left message for patient to call back. Patient has appointment scheduled with Dr. Samano on 2.15.23, but she is not taking new patients. Patient will need to schedule with another provider and Establish care.     Please assist in rescheduling.

## 2023-02-14 RX ORDER — TRIAMTERENE AND HYDROCHLOROTHIAZIDE 37.5; 25 MG/1; MG/1
1 CAPSULE ORAL DAILY
Qty: 90 CAPSULE | Refills: 0 | Status: SHIPPED | OUTPATIENT
Start: 2023-02-14 | End: 2023-05-16

## 2023-02-14 RX ORDER — TRAZODONE HYDROCHLORIDE 50 MG/1
TABLET, FILM COATED ORAL
Qty: 180 TABLET | Refills: 0 | Status: SHIPPED | OUTPATIENT
Start: 2023-02-14 | End: 2023-05-11

## 2023-02-14 RX ORDER — ATORVASTATIN CALCIUM 10 MG/1
10 TABLET, FILM COATED ORAL DAILY
Qty: 90 TABLET | Refills: 0 | Status: SHIPPED | OUTPATIENT
Start: 2023-02-14 | End: 2023-05-09

## 2023-02-14 NOTE — TELEPHONE ENCOUNTER
Reason for Call:  Medication or medication refill:    Do you use a [facility]? Casanova of the pharmacy and phone number for the current request:  [facility]    Name of the medication requested:     Triamterene-HCTZ (DYAIDE) 37.5-25 mg capsule  Trazodone (DESYREL) 50 mg tablet  Atorvastatin (LIPITOR) 10 mg tablet    Other request: Patient is out of triamterene-hydrochlorothiazide and would like refill sent today if at all possible.    Establish Care Appt scheduled with Dr. Ivonne Briggs - 04/04/2023  Last appt with PCP = 09/07/2021    Can we leave a detailed message on this number? Not Applicable - Denty'shart Message works best.       Call taken on 2/14/2023 at 11:14 AM by Gemma Meyers

## 2023-04-01 ASSESSMENT — ENCOUNTER SYMPTOMS
CHILLS: 0
PARESTHESIAS: 0
HEADACHES: 1
EYE PAIN: 0
BREAST MASS: 0
DYSURIA: 0
FREQUENCY: 0
ARTHRALGIAS: 1
HEARTBURN: 0
MYALGIAS: 1
HEMATURIA: 0
PALPITATIONS: 0
NERVOUS/ANXIOUS: 0
HEMATOCHEZIA: 0
WEAKNESS: 0
SORE THROAT: 0
DIARRHEA: 0
COUGH: 0
NAUSEA: 0
JOINT SWELLING: 0
CONSTIPATION: 0
ABDOMINAL PAIN: 0
FEVER: 0
SHORTNESS OF BREATH: 0
DIZZINESS: 0

## 2023-04-01 ASSESSMENT — ASTHMA QUESTIONNAIRES
ACT_TOTALSCORE: 25
ACT_TOTALSCORE: 25
QUESTION_4 LAST FOUR WEEKS HOW OFTEN HAVE YOU USED YOUR RESCUE INHALER OR NEBULIZER MEDICATION (SUCH AS ALBUTEROL): NOT AT ALL
QUESTION_3 LAST FOUR WEEKS HOW OFTEN DID YOUR ASTHMA SYMPTOMS (WHEEZING, COUGHING, SHORTNESS OF BREATH, CHEST TIGHTNESS OR PAIN) WAKE YOU UP AT NIGHT OR EARLIER THAN USUAL IN THE MORNING: NOT AT ALL
QUESTION_5 LAST FOUR WEEKS HOW WOULD YOU RATE YOUR ASTHMA CONTROL: COMPLETELY CONTROLLED
QUESTION_1 LAST FOUR WEEKS HOW MUCH OF THE TIME DID YOUR ASTHMA KEEP YOU FROM GETTING AS MUCH DONE AT WORK, SCHOOL OR AT HOME: NONE OF THE TIME
QUESTION_2 LAST FOUR WEEKS HOW OFTEN HAVE YOU HAD SHORTNESS OF BREATH: NOT AT ALL

## 2023-04-04 ENCOUNTER — OFFICE VISIT (OUTPATIENT)
Dept: FAMILY MEDICINE | Facility: CLINIC | Age: 58
End: 2023-04-04
Payer: COMMERCIAL

## 2023-04-04 VITALS
WEIGHT: 293 LBS | TEMPERATURE: 97.9 F | BODY MASS INDEX: 50.02 KG/M2 | SYSTOLIC BLOOD PRESSURE: 138 MMHG | DIASTOLIC BLOOD PRESSURE: 92 MMHG | HEART RATE: 97 BPM | HEIGHT: 64 IN | OXYGEN SATURATION: 96 %

## 2023-04-04 DIAGNOSIS — G43.111 INTRACTABLE MIGRAINE WITH AURA WITH STATUS MIGRAINOSUS: ICD-10-CM

## 2023-04-04 DIAGNOSIS — Z00.00 ROUTINE GENERAL MEDICAL EXAMINATION AT A HEALTH CARE FACILITY: Primary | ICD-10-CM

## 2023-04-04 DIAGNOSIS — Z12.31 VISIT FOR SCREENING MAMMOGRAM: ICD-10-CM

## 2023-04-04 DIAGNOSIS — N18.30 TYPE 2 DIABETES MELLITUS WITH STAGE 3 CHRONIC KIDNEY DISEASE, WITHOUT LONG-TERM CURRENT USE OF INSULIN, UNSPECIFIED WHETHER STAGE 3A OR 3B CKD (H): ICD-10-CM

## 2023-04-04 DIAGNOSIS — E03.9 ACQUIRED HYPOTHYROIDISM: ICD-10-CM

## 2023-04-04 DIAGNOSIS — E11.22 TYPE 2 DIABETES MELLITUS WITH STAGE 3 CHRONIC KIDNEY DISEASE, WITHOUT LONG-TERM CURRENT USE OF INSULIN, UNSPECIFIED WHETHER STAGE 3A OR 3B CKD (H): ICD-10-CM

## 2023-04-04 DIAGNOSIS — E06.3 HYPOTHYROIDISM DUE TO HASHIMOTO'S THYROIDITIS: ICD-10-CM

## 2023-04-04 DIAGNOSIS — E66.01 MORBID OBESITY (H): ICD-10-CM

## 2023-04-04 DIAGNOSIS — Z12.4 CERVICAL CANCER SCREENING: ICD-10-CM

## 2023-04-04 DIAGNOSIS — Z11.4 SCREENING FOR HIV (HUMAN IMMUNODEFICIENCY VIRUS): ICD-10-CM

## 2023-04-04 DIAGNOSIS — N18.30 STAGE 3 CHRONIC KIDNEY DISEASE, UNSPECIFIED WHETHER STAGE 3A OR 3B CKD (H): ICD-10-CM

## 2023-04-04 LAB
ALBUMIN UR-MCNC: NEGATIVE MG/DL
ANION GAP SERPL CALCULATED.3IONS-SCNC: 14 MMOL/L (ref 7–15)
APPEARANCE UR: CLEAR
BACTERIA #/AREA URNS HPF: ABNORMAL /HPF
BILIRUB UR QL STRIP: NEGATIVE
BUN SERPL-MCNC: 21.8 MG/DL (ref 6–20)
CALCIUM SERPL-MCNC: 10 MG/DL (ref 8.6–10)
CHLORIDE SERPL-SCNC: 99 MMOL/L (ref 98–107)
CHOLEST SERPL-MCNC: 156 MG/DL
COLOR UR AUTO: YELLOW
CREAT SERPL-MCNC: 1.01 MG/DL (ref 0.51–0.95)
CREAT UR-MCNC: 83.6 MG/DL
DEPRECATED HCO3 PLAS-SCNC: 26 MMOL/L (ref 22–29)
GFR SERPL CREATININE-BSD FRML MDRD: 65 ML/MIN/1.73M2
GLUCOSE SERPL-MCNC: 257 MG/DL (ref 70–99)
GLUCOSE UR STRIP-MCNC: NEGATIVE MG/DL
HBA1C MFR BLD: 10.9 % (ref 0–5.6)
HDLC SERPL-MCNC: 39 MG/DL
HGB BLD-MCNC: 13.3 G/DL (ref 11.7–15.7)
HGB UR QL STRIP: NEGATIVE
KETONES UR STRIP-MCNC: NEGATIVE MG/DL
LDLC SERPL CALC-MCNC: 61 MG/DL
LEUKOCYTE ESTERASE UR QL STRIP: ABNORMAL
MICROALBUMIN UR-MCNC: <12 MG/L
MICROALBUMIN/CREAT UR: NORMAL MG/G{CREAT}
NITRATE UR QL: NEGATIVE
NONHDLC SERPL-MCNC: 117 MG/DL
PH UR STRIP: 5 [PH] (ref 5–8)
POTASSIUM SERPL-SCNC: 4.2 MMOL/L (ref 3.4–5.3)
RBC #/AREA URNS AUTO: ABNORMAL /HPF
SODIUM SERPL-SCNC: 139 MMOL/L (ref 136–145)
SP GR UR STRIP: 1.02 (ref 1–1.03)
SQUAMOUS #/AREA URNS AUTO: ABNORMAL /LPF
T4 FREE SERPL-MCNC: 2.37 NG/DL (ref 0.9–1.7)
TRIGL SERPL-MCNC: 280 MG/DL
TSH SERPL DL<=0.005 MIU/L-ACNC: 0.05 UIU/ML (ref 0.3–4.2)
UROBILINOGEN UR STRIP-ACNC: 0.2 E.U./DL
WBC #/AREA URNS AUTO: ABNORMAL /HPF

## 2023-04-04 PROCEDURE — 83036 HEMOGLOBIN GLYCOSYLATED A1C: CPT | Performed by: FAMILY MEDICINE

## 2023-04-04 PROCEDURE — 80061 LIPID PANEL: CPT | Performed by: FAMILY MEDICINE

## 2023-04-04 PROCEDURE — 84443 ASSAY THYROID STIM HORMONE: CPT | Performed by: FAMILY MEDICINE

## 2023-04-04 PROCEDURE — 91312 COVID-19 VACCINE BIVALENT BOOSTER 12+ (PFIZER): CPT | Performed by: FAMILY MEDICINE

## 2023-04-04 PROCEDURE — 82570 ASSAY OF URINE CREATININE: CPT | Performed by: FAMILY MEDICINE

## 2023-04-04 PROCEDURE — 0124A COVID-19 VACCINE BIVALENT BOOSTER 12+ (PFIZER): CPT | Performed by: FAMILY MEDICINE

## 2023-04-04 PROCEDURE — 84439 ASSAY OF FREE THYROXINE: CPT | Performed by: FAMILY MEDICINE

## 2023-04-04 PROCEDURE — 87389 HIV-1 AG W/HIV-1&-2 AB AG IA: CPT | Performed by: FAMILY MEDICINE

## 2023-04-04 PROCEDURE — 36415 COLL VENOUS BLD VENIPUNCTURE: CPT | Performed by: FAMILY MEDICINE

## 2023-04-04 PROCEDURE — 80048 BASIC METABOLIC PNL TOTAL CA: CPT | Performed by: FAMILY MEDICINE

## 2023-04-04 PROCEDURE — 99214 OFFICE O/P EST MOD 30 MIN: CPT | Mod: 25 | Performed by: FAMILY MEDICINE

## 2023-04-04 PROCEDURE — 82043 UR ALBUMIN QUANTITATIVE: CPT | Performed by: FAMILY MEDICINE

## 2023-04-04 PROCEDURE — 99396 PREV VISIT EST AGE 40-64: CPT | Mod: 25 | Performed by: FAMILY MEDICINE

## 2023-04-04 PROCEDURE — 81001 URINALYSIS AUTO W/SCOPE: CPT | Performed by: FAMILY MEDICINE

## 2023-04-04 PROCEDURE — 85018 HEMOGLOBIN: CPT | Performed by: FAMILY MEDICINE

## 2023-04-04 RX ORDER — LEVOTHYROXINE SODIUM 75 UG/1
TABLET ORAL
Qty: 90 TABLET | Refills: 3 | Status: SHIPPED | OUTPATIENT
Start: 2023-04-04 | End: 2023-04-04

## 2023-04-04 RX ORDER — SUMATRIPTAN 50 MG/1
TABLET, FILM COATED ORAL
Qty: 9 TABLET | Refills: 11 | Status: SHIPPED | OUTPATIENT
Start: 2023-04-04 | End: 2024-05-14

## 2023-04-04 RX ORDER — LEVOTHYROXINE SODIUM 75 UG/1
TABLET ORAL
Qty: 90 TABLET | Refills: 3 | Status: SHIPPED | OUTPATIENT
Start: 2023-04-04 | End: 2023-07-05 | Stop reason: DRUGHIGH

## 2023-04-04 RX ORDER — METOCLOPRAMIDE 5 MG/1
5 TABLET ORAL
Qty: 16 TABLET | Refills: 1 | Status: SHIPPED | OUTPATIENT
Start: 2023-04-04

## 2023-04-04 ASSESSMENT — ENCOUNTER SYMPTOMS
EYE PAIN: 0
SORE THROAT: 0
ABDOMINAL PAIN: 0
COUGH: 0
DYSURIA: 0
CHILLS: 0
FREQUENCY: 0
HEARTBURN: 0
PALPITATIONS: 0
FEVER: 0
BREAST MASS: 0
WEAKNESS: 0
ARTHRALGIAS: 1
DIARRHEA: 0
NAUSEA: 0
SHORTNESS OF BREATH: 0
HEADACHES: 1
MYALGIAS: 1
HEMATOCHEZIA: 0
DIZZINESS: 0
PARESTHESIAS: 0
HEMATURIA: 0
NERVOUS/ANXIOUS: 0
JOINT SWELLING: 0
CONSTIPATION: 0

## 2023-04-04 ASSESSMENT — PAIN SCALES - GENERAL: PAINLEVEL: NO PAIN (0)

## 2023-04-04 NOTE — PROGRESS NOTES
SUBJECTIVE:   CC: Larissa is an 57 year old who presents for preventive health visit.       4/4/2023     8:43 AM   Additional Questions   Roomed by Eulalia   Patient has been advised of split billing requirements and indicates understanding: Yes  Healthy Habits:     Getting at least 3 servings of Calcium per day:  Yes    Bi-annual eye exam:  Yes    Dental care twice a year:  Yes    Sleep apnea or symptoms of sleep apnea:  None    Diet:  Regular (no restrictions)    Frequency of exercise:  1 day/week    Duration of exercise:  15-30 minutes    Taking medications regularly:  Yes    Medication side effects:  None    PHQ-2 Total Score: 0    Additional concerns today:  No    Hypertension Follow-up      Do you check your blood pressure regularly outside of the clinic? No - first diagnosis    Are you following a low salt diet? No    Are your blood pressures ever more than 140 on the top number (systolic) OR more   than 90 on the bottom number (diastolic), for example 140/90? No (NA)    Patient's blood pressure was elevated at outside visit at the dentists. Patient does endorse headaches but it usually occurs with weather changes, lately she has endorsed waking up with a headache twice in the last two weeks. No vision changes, no chest pain, new CHILEL, new leg swelling. She has been told she has white coat hypertension. She is on a baseline blood pressure medication that she takes for swelling.     Patient gets yeast infections from metformin (but not too often)     Last eye exam was September 2021.     Today's PHQ-2 Score:       4/1/2023    10:54 AM   PHQ-2 ( 1999 [employer])   Q1: Little interest or pleasure in doing things 0   Q2: Feeling down, depressed or hopeless 0   PHQ-2 Score 0   Q1: Little interest or pleasure in doing things Not at all   Q2: Feeling down, depressed or hopeless Not at all   PHQ-2 Score 0       Have you ever done Advance Care Planning? (For example, a Health Directive, POLST, or a discussion with a  medical provider or your loved ones about your wishes): No, advance care planning information given to patient to review.  Advanced care planning was discussed at today's visit.    Social History     Tobacco Use     Smoking status: Never     Smokeless tobacco: Never   Vaping Use     Vaping status: Never Used   Substance Use Topics     Alcohol use: Not Currently           2023    10:53 AM   Alcohol Use   Prescreen: >3 drinks/day or >7 drinks/week? Not Applicable          View : No data to display.              Reviewed orders with patient.  Reviewed health maintenance and updated orders accordingly - Yes  Lab work is in process    Breast Cancer Screenin/1/2023    10:56 AM   Breast CA Risk Assessment (FHS-7)   Do you have a family history of breast, colon, or ovarian cancer? No / Unknown       Mammogram Screening: Recommended mammography every 1-2 years with patient discussion and risk factor consideration  Pertinent mammograms are reviewed under the imaging tab.    History of abnormal Pap smear: NO - age 30-65 PAP every 5 years with negative HPV co-testing recommended      2017    12:33 PM 2014     2:44 PM   PAP / HPV   PAP Negative for squamous intraepithelial lesion or malignancy  Electronically signed by Alba Ramos CT (ASCP) on 10/2/2017 at  9:03 AM     Negative for squamous intraepithelial lesion or malignancy  Electronically signed by Beverly Reyes CT (ASCP) on 10/10/2014 at  2:22 PM         Reviewed and updated as needed this visit by clinical staff   Tobacco  Allergies  Meds  Problems  Med Hx  Surg Hx  Fam Hx          Reviewed and updated as needed this visit by Provider   Tobacco  Allergies  Meds  Problems  Med Hx  Surg Hx  Fam Hx         Past Medical History:   Diagnosis Date     Arthritis      Diabetes (H)      Hypertension      Migraine      Thyroid disease       Past Surgical History:   Procedure Laterality Date     EYE SURGERY      Cataract surgery   "    Review of Systems   Constitutional: Negative for chills and fever.   HENT: Negative for congestion, ear pain, hearing loss and sore throat.    Eyes: Negative for pain and visual disturbance.   Respiratory: Negative for cough and shortness of breath.    Cardiovascular: Negative for chest pain, palpitations and peripheral edema.   Gastrointestinal: Negative for abdominal pain, constipation, diarrhea, heartburn, hematochezia and nausea.   Breasts:  Negative for tenderness, breast mass and discharge.   Genitourinary: Negative for dysuria, frequency, genital sores, hematuria, pelvic pain, urgency, vaginal bleeding and vaginal discharge.   Musculoskeletal: Positive for arthralgias and myalgias. Negative for joint swelling.   Skin: Negative for rash.   Neurological: Positive for headaches. Negative for dizziness, weakness and paresthesias.   Psychiatric/Behavioral: Negative for mood changes. The patient is not nervous/anxious.      OBJECTIVE:   BP (!) 138/92   Pulse 97   Temp 97.9  F (36.6  C) (Oral)   Ht 1.619 m (5' 3.75\")   Wt 133.3 kg (293 lb 12.8 oz)   SpO2 96%   BMI 50.83 kg/m    Physical Exam  GENERAL: healthy, alert and no distress  EYES: Eyes grossly normal to inspection, PERRL and conjunctivae and sclerae normal  HENT: ear canals and TM's normal, nose and mouth without ulcers or lesions  NECK: no adenopathy, no asymmetry, masses, or scars and thyroid normal to palpation  RESP: lungs clear to auscultation - no rales, rhonchi or wheezes  CV: regular rate and rhythm, normal S1 S2, no S3 or S4, no murmur, click or rub, no peripheral edema and peripheral pulses strong  ABDOMEN: soft, nontender, no hepatosplenomegaly, no masses and bowel sounds normal  MS: no gross musculoskeletal defects noted, no edema  SKIN: no suspicious lesions or rashes and hair qualityalopecia  NEURO: Normal strength and tone, mentation intact and speech normal  PSYCH: mentation appears normal, affect normal/bright    Diagnostic Test " Results:  Labs reviewed in Epic  No results found for this or any previous visit (from the past 24 hour(s)).    ASSESSMENT/PLAN:   Larissa was seen today for physical, establish care and hypertension.    Diagnoses and all orders for this visit:    Routine general medical examination at a health care facility  -     REVIEW OF HEALTH MAINTENANCE PROTOCOL ORDERS  -     PRIMARY CARE FOLLOW-UP SCHEDULING; Future    Stage 3 chronic kidney disease, unspecified whether stage 3a or 3b CKD (H)  Chronic, need re-assessment in whether or not patient CKD progressed. Recommend addition of ARB in setting of new hypertension. Likely will consider olmesartan versus valsartan. Will need to renally adjust medication as appropriate.   -     UA reflex to Microscopic and Culture ([id number]); Future  -     Albumin Random Urine Quantitative with Creat Ratio; Future  -     BASIC METABOLIC PANEL; Future  -     Hemoglobin; Future  -     UA reflex to Microscopic and Culture ([id number])  -     Albumin Random Urine Quantitative with Creat Ratio  -     BASIC METABOLIC PANEL  -     Hemoglobin    Type 2 diabetes mellitus with stage 3 chronic kidney disease, without long-term current use of insulin, unspecified whether stage 3a or 3b CKD (H)  Chronic, stable. Patient is asymptomatic. Refilled amaryl and metformin prior to visit to bridge patient. Rysbelsus at 7 mg, could consider increase but more likely to recommend patient try SGLT2 inhibitor if able to tolerate.    -     HEMOGLOBIN A1C; Future  -     Lipid panel reflex to direct LDL Non-fasting; Future  -     HEMOGLOBIN A1C  -     Lipid panel reflex to direct LDL Non-fasting    Hypothyroidism due to Hashimoto's thyroiditis  Chronic, stable. Some signs of alopecia, but no other signs of hypothyroidism. On 275 mcg. Will continue to trend.   -     TSH WITH FREE T4 REFLEX; Future  -     TSH WITH FREE T4 REFLEX    Screening for HIV (human immunodeficiency virus)  -     HIV Antigen Antibody Combo; Future  -      HIV Antigen Antibody Combo    Cervical cancer screening    Visit for screening mammogram  -     MA SCREENING DIGITAL BILAT - Future  (s+30); Future    Acquired hypothyroidism  As above  -     Discontinue: levothyroxine (SYNTHROID) 75 MCG tablet; TAKE 1 TABLET DAILY IN     ADDITION TO THE 200MCG     TABLET  -     levothyroxine (SYNTHROID) 75 MCG tablet; TAKE 1 TABLET DAILY IN     ADDITION TO THE 200MCG     TABLET    Intractable migraine with aura with status migrainosus  Chronic, stable. Does have poor side effects, however not taking at indicated time. Recommend at onset of migraine and assess for improvement.   -     SUMAtriptan (IMITREX) 50 MG tablet; [SUMATRIPTAN (IMITREX) 50 MG TABLET] TAKE 1 TABLET BY MOUTH  EVERY 2 HOURS AS NEEDED FOR MIGRAINE. MAXIMUM OF 2  TABLETS PER 24 HOURS  -     metoclopramide (REGLAN) 5 MG tablet; Take 1 tablet (5 mg) by mouth 4 times daily (before meals and nightly) Take with onset of migraine    Morbid obesity (H)  Chronic, weight loss would likely improve the patient's blood pressure,diabetes. Is on GLP-1 RA oral, technically not FDA approved for weight loss but with weight loss effects. Continue regimen. If insurance allows, consider switch to mounjaro.     Other orders  -     REVIEW OF HEALTH MAINTENANCE PROTOCOL ORDERS  -     COVID-19 VACCINE BIVALENT BOOSTER 12+ (PFIZER)        Patient has been advised of split billing requirements and indicates understanding: Yes      COUNSELING:  Reviewed preventive health counseling, as reflected in patient instructions        She reports that she has never smoked. She has never used smokeless tobacco.    Ivonne Briggs DO  [facility]

## 2023-04-04 NOTE — PATIENT INSTRUCTIONS
For the blood pressure, I would like to get an up to date kidney function and blood pressure at home to see if we need to do the medications.     If you can get a blood pressure cuff for home that would be great, otherwise we can order one, please find out through insurance who would supply the blood pressure cuff!    It was very nice meeting you!!         Preventive Health Recommendations  Female Ages 50 - 64    Yearly exam: See your health care provider every year in order to  Review health changes.   Discuss preventive care.    Review your medicines if your doctor has prescribed any.    Get a Pap test every three years (unless you have an abnormal result and your provider advises testing more often).  If you get Pap tests with HPV test, you only need to test every 5 years, unless you have an abnormal result.   You do not need a Pap test if your uterus was removed (hysterectomy) and you have not had cancer.  You should be tested each year for STDs (sexually transmitted diseases) if you're at risk.   Have a mammogram every 1 to 2 years.  Have a colonoscopy at age 50, or have a yearly FIT test (stool test). These exams screen for colon cancer.    Have a cholesterol test every 5 years, or more often if advised.  Have a diabetes test (fasting glucose) every three years. If you are at risk for diabetes, you should have this test more often.   If you are at risk for osteoporosis (brittle bone disease), think about having a bone density scan (DEXA).    Shots: Get a flu shot each year. Get a tetanus shot every 10 years.    Nutrition:   Eat at least 5 servings of fruits and vegetables each day.  Eat whole-grain bread, whole-wheat pasta and brown rice instead of white grains and rice.  Get adequate Calcium and Vitamin D.     Lifestyle  Exercise at least 150 minutes a week (30 minutes a day, 5 days a week). This will help you control your weight and prevent disease.  Limit alcohol to one drink per day.  No smoking.   Wear  sunscreen to prevent skin cancer.   See your dentist every six months for an exam and cleaning.  See your eye doctor every 1 to 2 years.

## 2023-04-05 LAB — HIV 1+2 AB+HIV1 P24 AG SERPL QL IA: NONREACTIVE

## 2023-04-05 NOTE — RESULT ENCOUNTER NOTE
Dear Larissa,     Here are your recent results which are within the expected range. Please continue with your current plan of care and let us know if you have any questions or concerns.    Regards,  Ivonne Briggs, DO

## 2023-04-05 NOTE — RESULT ENCOUNTER NOTE
Dear Larissa,     Here are your recent results. Your cholesterol and kidney function look great!    In terms of diabetes, there are a couple options. I would like to increase your rybelsus to the maximum dose. However, as I stated earlier, you can have an increased risk of side effects at lower doses. The other option would be to add a medication that helps you urinate excess glucose, this does increase likelihood for yeast infections so we would have to keep an eye on that.    The thyroid medication is also a little too high. We should aim to get the TSH 1.00-2.00 depending on symptoms. At high doses you can risk having heart arhythmia. Can we cut the 75 mcg of synthroid in half for one week, and then take it away altogether? I would like to check in 3-4 weeks to make sure it is trending in an OK direction.     If you feel like you would like a second opinion, I feel you would be a great patient for endocrinology. They may have other ideas.     Please let us know if you have any questions or concerns.    Regards,  Ivonne Briggs, DO

## 2023-04-07 DIAGNOSIS — N18.30 TYPE 2 DIABETES MELLITUS WITH STAGE 3 CHRONIC KIDNEY DISEASE, WITHOUT LONG-TERM CURRENT USE OF INSULIN, UNSPECIFIED WHETHER STAGE 3A OR 3B CKD (H): Primary | ICD-10-CM

## 2023-04-07 DIAGNOSIS — E11.22 TYPE 2 DIABETES MELLITUS WITH STAGE 3 CHRONIC KIDNEY DISEASE, WITHOUT LONG-TERM CURRENT USE OF INSULIN, UNSPECIFIED WHETHER STAGE 3A OR 3B CKD (H): Primary | ICD-10-CM

## 2023-04-07 RX ORDER — DAPAGLIFLOZIN 5 MG/1
5 TABLET, FILM COATED ORAL DAILY
Qty: 30 TABLET | Refills: 1 | Status: SHIPPED | OUTPATIENT
Start: 2023-04-07 | End: 2023-06-05

## 2023-05-08 DIAGNOSIS — E11.9 TYPE 2 DIABETES MELLITUS WITHOUT COMPLICATION, WITHOUT LONG-TERM CURRENT USE OF INSULIN (H): ICD-10-CM

## 2023-05-08 NOTE — TELEPHONE ENCOUNTER
Refill request from pharmacy for atorvastatin       Alfredo Jimenez Jr., CMA on 5/8/2023 at 12:22 PM

## 2023-05-09 RX ORDER — ATORVASTATIN CALCIUM 10 MG/1
10 TABLET, FILM COATED ORAL DAILY
Qty: 90 TABLET | Refills: 3 | Status: SHIPPED | OUTPATIENT
Start: 2023-05-09 | End: 2024-04-29

## 2023-05-09 NOTE — TELEPHONE ENCOUNTER
"Last Written Prescription Date:  2/14/2023  Last Fill Quantity: 90,  # refills: 0   Last office visit provider: 4/4/2023 with Dr LYNETTE Briggs     Requested Prescriptions   Pending Prescriptions Disp Refills     atorvastatin (LIPITOR) 10 MG tablet 90 tablet 0     Sig: Take 1 tablet (10 mg) by mouth daily       Statins Protocol Passed - 5/8/2023 12:26 PM        Passed - LDL on file in past 12 months     Recent Labs   Lab Test 04/04/23  1007   LDL 61             Passed - No abnormal creatine kinase in past 12 months     No lab results found.             Passed - Recent (12 mo) or future (30 days) visit within the authorizing provider's specialty     Patient has had an office visit with the authorizing provider or a provider within the authorizing providers department within the previous 12 mos or has a future within next 30 days. See \"Patient Info\" tab in inbasket, or \"Choose Columns\" in Meds & Orders section of the refill encounter.              Passed - Medication is active on med list        Passed - Patient is age 18 or older        Passed - No active pregnancy on record        Passed - No positive pregnancy test in past 12 months             Maria De Jesus Henning RN 05/09/23 4:22 PM  "

## 2023-05-10 DIAGNOSIS — I10 PRIMARY HYPERTENSION: ICD-10-CM

## 2023-05-10 DIAGNOSIS — F51.01 PRIMARY INSOMNIA: ICD-10-CM

## 2023-05-11 RX ORDER — TRIAMTERENE AND HYDROCHLOROTHIAZIDE 37.5; 25 MG/1; MG/1
1 CAPSULE ORAL DAILY
Qty: 90 CAPSULE | Refills: 1 | OUTPATIENT
Start: 2023-05-11

## 2023-05-11 RX ORDER — TRAZODONE HYDROCHLORIDE 50 MG/1
TABLET, FILM COATED ORAL
Qty: 180 TABLET | Refills: 0 | Status: SHIPPED | OUTPATIENT
Start: 2023-05-11 | End: 2023-07-27

## 2023-05-11 NOTE — TELEPHONE ENCOUNTER
"traZODone (DESYREL) 50 MG tablet  Last Written Prescription Date:  2/14/2023  Last Fill Quantity: 180,  # refills: 0   Last office visit provider:  4/4/2023     Routing refill request to provider for review/approval because:  Labs out of range:  BP, Cr    triamterene-HCTZ (DYAZIDE) 37.5-25 MG capsule  Last Written Prescription Date:  2/14/2023  Last Fill Quantity: 90,  # refills: 0   Last office visit provider:  4/4/2023     Requested Prescriptions   Pending Prescriptions Disp Refills     triamterene-HCTZ (DYAZIDE) 37.5-25 MG capsule 90 capsule 1     Sig: Take 1 capsule by mouth daily       Diuretics (Including Combos) Protocol Failed - 5/10/2023 10:17 AM        Failed - Blood pressure under 140/90 in past 12 months     BP Readings from Last 3 Encounters:   04/04/23 (!) 138/92   09/07/21 126/78   08/05/21 138/86                 Failed - Normal serum creatinine on file in past 12 months     Recent Labs   Lab Test 04/04/23  1007   CR 1.01*              Passed - Recent (12 mo) or future (30 days) visit within the authorizing provider's specialty     Patient has had an office visit with the authorizing provider or a provider within the authorizing providers department within the previous 12 mos or has a future within next 30 days. See \"Patient Info\" tab in inbasket, or \"Choose Columns\" in Meds & Orders section of the refill encounter.              Passed - Medication is active on med list        Passed - Patient is age 18 or older        Passed - No active pregancy on record        Passed - Normal serum potassium on file in past 12 months     Recent Labs   Lab Test 04/04/23  1007   POTASSIUM 4.2                    Passed - Normal serum sodium on file in past 12 months     Recent Labs   Lab Test 04/04/23  1007                 Passed - No positive pregnancy test in past 12 months           traZODone (DESYREL) 50 MG tablet 180 tablet 0     Sig: TAKE 1 TO 2 TABLETS (50-100MG TOTAL) AT BEDTIME Strength: 50 mg       " "Serotonin Modulators Passed - 5/10/2023 10:17 AM        Passed - Recent (12 mo) or future (30 days) visit within the authorizing provider's specialty     Patient has had an office visit with the authorizing provider or a provider within the authorizing providers department within the previous 12 mos or has a future within next 30 days. See \"Patient Info\" tab in inbasket, or \"Choose Columns\" in Meds & Orders section of the refill encounter.              Passed - Medication is active on med list        Passed - Patient is age 18 or older        Passed - No active pregnancy on record        Passed - No positive pregnancy test in past 12 months             Kristy Spivey RN 05/11/23 3:41 AM  "

## 2023-05-16 ENCOUNTER — MYC REFILL (OUTPATIENT)
Dept: FAMILY MEDICINE | Facility: CLINIC | Age: 58
End: 2023-05-16
Payer: COMMERCIAL

## 2023-05-16 DIAGNOSIS — I10 PRIMARY HYPERTENSION: ICD-10-CM

## 2023-05-17 NOTE — TELEPHONE ENCOUNTER
"Routing refill request to provider for review/approval because:  Labs out of range:  Cr, bp    Last Written Prescription Date:  2/14/23  Last Fill Quantity: 90,  # refills: 0   Last office visit provider:  4/4/23     Requested Prescriptions   Pending Prescriptions Disp Refills     triamterene-HCTZ (DYAZIDE) 37.5-25 MG capsule 90 capsule 0     Sig: Take 1 capsule by mouth daily       Diuretics (Including Combos) Protocol Failed - 5/16/2023  7:17 PM        Failed - Blood pressure under 140/90 in past 12 months     BP Readings from Last 3 Encounters:   04/04/23 (!) 138/92   09/07/21 126/78   08/05/21 138/86                 Failed - Normal serum creatinine on file in past 12 months     Recent Labs   Lab Test 04/04/23  1007   CR 1.01*              Passed - Recent (12 mo) or future (30 days) visit within the authorizing provider's specialty     Patient has had an office visit with the authorizing provider or a provider within the authorizing providers department within the previous 12 mos or has a future within next 30 days. See \"Patient Info\" tab in inbasket, or \"Choose Columns\" in Meds & Orders section of the refill encounter.              Passed - Medication is active on med list        Passed - Patient is age 18 or older        Passed - No active pregancy on record        Passed - Normal serum potassium on file in past 12 months     Recent Labs   Lab Test 04/04/23  1007   POTASSIUM 4.2                    Passed - Normal serum sodium on file in past 12 months     Recent Labs   Lab Test 04/04/23  1007                 Passed - No positive pregnancy test in past 12 months             Gemma Mcgrath, RN 05/17/23 6:53 PM  "

## 2023-05-18 ENCOUNTER — MYC MEDICAL ADVICE (OUTPATIENT)
Dept: FAMILY MEDICINE | Facility: CLINIC | Age: 58
End: 2023-05-18
Payer: COMMERCIAL

## 2023-05-18 RX ORDER — TRIAMTERENE AND HYDROCHLOROTHIAZIDE 37.5; 25 MG/1; MG/1
1 CAPSULE ORAL DAILY
Qty: 90 CAPSULE | Refills: 0 | Status: SHIPPED | OUTPATIENT
Start: 2023-05-18 | End: 2023-08-09

## 2023-05-20 ENCOUNTER — HEALTH MAINTENANCE LETTER (OUTPATIENT)
Age: 58
End: 2023-05-20

## 2023-05-24 ENCOUNTER — ANCILLARY PROCEDURE (OUTPATIENT)
Dept: MAMMOGRAPHY | Facility: HOSPITAL | Age: 58
End: 2023-05-24
Attending: FAMILY MEDICINE
Payer: COMMERCIAL

## 2023-05-24 ENCOUNTER — OFFICE VISIT (OUTPATIENT)
Dept: FAMILY MEDICINE | Facility: CLINIC | Age: 58
End: 2023-05-24
Attending: FAMILY MEDICINE
Payer: COMMERCIAL

## 2023-05-24 VITALS
BODY MASS INDEX: 49.47 KG/M2 | SYSTOLIC BLOOD PRESSURE: 132 MMHG | DIASTOLIC BLOOD PRESSURE: 92 MMHG | OXYGEN SATURATION: 95 % | HEART RATE: 100 BPM | TEMPERATURE: 98.1 F | RESPIRATION RATE: 16 BRPM | WEIGHT: 289.8 LBS | HEIGHT: 64 IN

## 2023-05-24 DIAGNOSIS — E11.22 TYPE 2 DIABETES MELLITUS WITH STAGE 2 CHRONIC KIDNEY DISEASE, WITHOUT LONG-TERM CURRENT USE OF INSULIN (H): ICD-10-CM

## 2023-05-24 DIAGNOSIS — N18.2 TYPE 2 DIABETES MELLITUS WITH STAGE 2 CHRONIC KIDNEY DISEASE, WITHOUT LONG-TERM CURRENT USE OF INSULIN (H): ICD-10-CM

## 2023-05-24 DIAGNOSIS — I10 ESSENTIAL HYPERTENSION: Primary | ICD-10-CM

## 2023-05-24 DIAGNOSIS — E03.9 HYPOTHYROIDISM, UNSPECIFIED TYPE: ICD-10-CM

## 2023-05-24 DIAGNOSIS — Z12.31 VISIT FOR SCREENING MAMMOGRAM: ICD-10-CM

## 2023-05-24 PROCEDURE — 77067 SCR MAMMO BI INCL CAD: CPT

## 2023-05-24 PROCEDURE — 99214 OFFICE O/P EST MOD 30 MIN: CPT | Performed by: FAMILY MEDICINE

## 2023-05-24 RX ORDER — LISINOPRIL 10 MG/1
10 TABLET ORAL DAILY
Qty: 30 TABLET | Refills: 1 | Status: SHIPPED | OUTPATIENT
Start: 2023-05-24 | End: 2023-07-19

## 2023-05-24 ASSESSMENT — PAIN SCALES - GENERAL: PAINLEVEL: NO PAIN (0)

## 2023-05-24 NOTE — PATIENT INSTRUCTIONS
Good job with the medications! Let me know if anything changes.   I will order the TSH for you to get in a month.   Because of the diabetes history, keep a close eye on the cyst. It does not look infected now. If it gets red, or white/pus filled let us know. We will try to drain it and see if you need antibiotics.

## 2023-05-24 NOTE — PROGRESS NOTES
"  Assessment & Plan     Essential hypertension  Chronic, progressed. In need of additional medical management to reach goal <130/80 mmHg. Patient also with minor CKD, when using formula adjusting for weight, GFR closer to 80. Discussed that in order to preserve kidney function, to continue doing blood pressure control.   - lisinopril (ZESTRIL) 10 MG tablet  Dispense: 30 tablet; Refill: 1  - Basic metabolic panel  (Ca, Cl, CO2, Creat, Gluc, K, Na, BUN)    Hypothyroidism, unspecified type  Chronic, improved. Is currently on 200 mcg of levothyroxine PO every day down from 275. Damar TSH 1-2.0. Discussed with patient. She feels her palpitations have improved.   - TSH    Type 2 diabetes mellitus with stage 2 chronic kidney disease, without long-term current use of insulin (H)  Chronic, progressed. Last a1c increased, added an SGLT2 inhibitor to help with swelling, and kidney function. Patient is tolerating the medication well. Recommend continuing this dose.   - Hemoglobin A1c      Ordering of each unique test  Prescription drug management  I spent a total of 25 minutes on the day of the visit.   Time spent by me doing chart review, history and exam, documentation and further activities per the note       BMI:   Estimated body mass index is 50.13 kg/m  as calculated from the following:    Height as of this encounter: 1.619 m (5' 3.75\").    Weight as of this encounter: 131.5 kg (289 lb 12.8 oz).   Weight management plan: Discussed healthy diet and exercise guidelines    See Patient Instructions    DO ARYNA Romano [facility]    Americo Dias is a 57 year old, presenting for the following health issues:  Follow Up and Derm Problem (Pt has a bump behind her right ear, would like to get it looked at. )        4/4/2023     8:43 AM   Additional Questions   Roomed by Eulalia     History of Present Illness       Reason for visit:  Follow up to check A1C    She eats 0-1 servings of fruits and " "vegetables daily.She consumes 1 sweetened beverage(s) daily.She exercises with enough effort to increase her heart rate 9 or less minutes per day.  She exercises with enough effort to increase her heart rate 3 or less days per week.   She is taking medications regularly.       Hypertension Follow-up      Do you check your blood pressure regularly outside of the clinic? No     Are you following a low salt diet? No    Are your blood pressures ever more than 140 on the top number (systolic) OR more   than 90 on the bottom number (diastolic), for example 140/90? No    Hypothyroidism  -Patient stopped taking the 75 mcg.   -Heart fluttering has decreased  -Initially diagnosed with cardiac effusion due to hypothyroidism, drained 2 L of fluid  -Patient is taking 200 mcg will need check in 1 month.     Patient denies increase in yeast infection frequency or severity.     Review of Systems   Constitutional, HEENT, cardiovascular, pulmonary, gi and gu systems are negative, except as otherwise noted.      Objective    BP (!) 132/92   Pulse 100   Temp 98.1  F (36.7  C) (Oral)   Resp 16   Ht 1.619 m (5' 3.75\")   Wt 131.5 kg (289 lb 12.8 oz)   LMP  (LMP Unknown)   SpO2 95%   BMI 50.13 kg/m    Body mass index is 50.13 kg/m .  Physical Exam   GENERAL: healthy, alert and no distress  EYES: Eyes grossly normal to inspection, PERRL and conjunctivae and sclerae normal  NECK: no adenopathy, no asymmetry, masses, or scars and thyroid normal to palpation  MS: no gross musculoskeletal defects noted, no edema  SKIN: tender, raised nodule behind the ear. No discharge or fluctuance noted.     Office Visit on 04/04/2023   Component Date Value Ref Range Status     Color Urine 04/04/2023 Yellow  Colorless, Straw, Light Yellow, Yellow Final     Appearance Urine 04/04/2023 Clear  Clear Final     Glucose Urine 04/04/2023 Negative  Negative mg/dL Final     Bilirubin Urine 04/04/2023 Negative  Negative Final     Ketones Urine 04/04/2023 " Negative  Negative mg/dL Final     Specific Gravity Urine 04/04/2023 1.020  1.005 - 1.030 Final     Blood Urine 04/04/2023 Negative  Negative Final     pH Urine 04/04/2023 5.0  5.0 - 8.0 Final     Protein Albumin Urine 04/04/2023 Negative  Negative mg/dL Final     Urobilinogen Urine 04/04/2023 0.2  0.2, 1.0 E.U./dL Final     Nitrite Urine 04/04/2023 Negative  Negative Final     Leukocyte Esterase Urine 04/04/2023 Trace (A)  Negative Final     HIV Antigen Antibody Combo 04/04/2023 Nonreactive  Nonreactive Final    HIV-1 p24 Ag & HIV-1/HIV-2 Ab Not Detected     Creatinine Urine mg/dL 04/04/2023 83.6  mg/dL Final    The reference ranges have not been established in urine creatinine. The results should be integrated into the clinical context for interpretation.     Albumin Urine mg/L 04/04/2023 <12.0  mg/L Final    The reference ranges have not been established in urine albumin. The results should be integrated into the clinical context for interpretation.     Albumin Urine mg/g Cr 04/04/2023    Final    Unable to calculate, urine albumin and/or urine creatinine is outside detectable limits.  Microalbuminuria is defined as an albumin:creatinine ratio of 17 to 299 for males and 25 to 299 for females. A ratio of albumin:creatinine of 300 or higher is indicative of overt proteinuria.  Due to biologic variability, positive results should be confirmed by a second, first-morning random or 24-hour timed urine specimen. If there is discrepancy, a third specimen is recommended. When 2 out of 3 results are in the microalbuminuria range, this is evidence for incipient nephropathy and warrants increased efforts at glucose control, blood pressure control, and institution of therapy with an angiotensin-converting-enzyme (ACE) inhibitor (if the patient can tolerate it).       Hemoglobin A1C 04/04/2023 10.9 (H)  0.0 - 5.6 % Final     Sodium 04/04/2023 139  136 - 145 mmol/L Final     Potassium 04/04/2023 4.2  3.4 - 5.3 mmol/L Final      Chloride 04/04/2023 99  98 - 107 mmol/L Final     Carbon Dioxide (CO2) 04/04/2023 26  22 - 29 mmol/L Final     Anion Gap 04/04/2023 14  7 - 15 mmol/L Final     Urea Nitrogen 04/04/2023 21.8 (H)  6.0 - 20.0 mg/dL Final     Creatinine 04/04/2023 1.01 (H)  0.51 - 0.95 mg/dL Final     Calcium 04/04/2023 10.0  8.6 - 10.0 mg/dL Final     Glucose 04/04/2023 257 (H)  70 - 99 mg/dL Final     GFR Estimate 04/04/2023 65  >60 mL/min/1.73m2 Final    eGFR calculated using 2021 CKD-EPI equation.     Cholesterol 04/04/2023 156  <200 mg/dL Final     Triglycerides 04/04/2023 280 (H)  <150 mg/dL Final     Direct Measure HDL 04/04/2023 39 (L)  >=50 mg/dL Final     LDL Cholesterol Calculated 04/04/2023 61  <=100 mg/dL Final     Non HDL Cholesterol 04/04/2023 117  <130 mg/dL Final     TSH 04/04/2023 0.05 (L)  0.30 - 4.20 uIU/mL Final     Hemoglobin 04/04/2023 13.3  11.7 - 15.7 g/dL Final     Bacteria Urine 04/04/2023 Few (A)  None Seen /HPF Final     RBC Urine 04/04/2023 0-2  0-2 /HPF /HPF Final     WBC Urine 04/04/2023 0-5  0-5 /HPF /HPF Final     Squamous Epithelials Urine 04/04/2023 Few (A)  None Seen /LPF Final     Free T4 04/04/2023 2.37 (H)  0.90 - 1.70 ng/dL Final

## 2023-06-04 DIAGNOSIS — E11.22 TYPE 2 DIABETES MELLITUS WITH STAGE 3 CHRONIC KIDNEY DISEASE, WITHOUT LONG-TERM CURRENT USE OF INSULIN, UNSPECIFIED WHETHER STAGE 3A OR 3B CKD (H): ICD-10-CM

## 2023-06-04 DIAGNOSIS — N18.30 TYPE 2 DIABETES MELLITUS WITH STAGE 3 CHRONIC KIDNEY DISEASE, WITHOUT LONG-TERM CURRENT USE OF INSULIN, UNSPECIFIED WHETHER STAGE 3A OR 3B CKD (H): ICD-10-CM

## 2023-06-05 ENCOUNTER — MYC MEDICAL ADVICE (OUTPATIENT)
Dept: FAMILY MEDICINE | Facility: CLINIC | Age: 58
End: 2023-06-05
Payer: COMMERCIAL

## 2023-06-05 DIAGNOSIS — E11.65 TYPE 2 DIABETES MELLITUS WITH HYPERGLYCEMIA, WITHOUT LONG-TERM CURRENT USE OF INSULIN (H): ICD-10-CM

## 2023-06-05 RX ORDER — DAPAGLIFLOZIN 5 MG/1
5 TABLET, FILM COATED ORAL DAILY
Qty: 30 TABLET | Refills: 1 | Status: SHIPPED | OUTPATIENT
Start: 2023-06-05 | End: 2023-08-03

## 2023-06-05 NOTE — TELEPHONE ENCOUNTER
"Routing refill request to provider for review/approval because:  PCP review for refills    Last Written Prescription Date:  4/7/23  Last Fill Quantity: 30,  # refills: 1   Last office visit provider:  5/24/23     Requested Prescriptions   Pending Prescriptions Disp Refills     dapagliflozin (FARXIGA) 5 MG TABS tablet 30 tablet 1     Sig: Take 1 tablet (5 mg) by mouth daily       Sodium Glucose Co-Transport Inhibitor Agents Passed - 6/4/2023 10:21 PM        Passed - Patient has documented A1c within the specified period of time.     If HgbA1C is 8 or greater, it needs to be on file within the past 3 months.  If less than 8, must be on file within the past 6 months.     Recent Labs   Lab Test 04/04/23  1007   A1C 10.9*             Passed - No creatinine >1.4 or GFR <45 within the past 12 mos     Recent Labs   Lab Test 04/04/23  1007 08/05/21  1724 03/12/21  0852   GFRESTIMATED 65   < > >60   GFRESTBLACK  --   --  >60    < > = values in this interval not displayed.       Recent Labs   Lab Test 04/04/23  1007   CR 1.01*             Passed - Medication is active on med list        Passed - Patient is age 18 or older        Passed - Patient is not pregnant        Passed - Patient has documented normal Potassium within the last 12 mos.     Recent Labs   Lab Test 04/04/23  1007   POTASSIUM 4.2             Passed - Patient has no positive pregnancy test within the past 12 mos.        Passed - Recent (6 mo) or future (30 days) visit within the authorizing provider's specialty     Patient had office visit in the last 6 months or has a visit in the next 30 days with authorizing provider or within the authorizing provider's specialty.  See \"Patient Info\" tab in inbasket, or \"Choose Columns\" in Meds & Orders section of the refill encounter.                 Jo-Ann Martinez RN 06/04/23 10:23 PM  "

## 2023-07-03 ENCOUNTER — TELEPHONE (OUTPATIENT)
Dept: FAMILY MEDICINE | Facility: CLINIC | Age: 58
End: 2023-07-03
Payer: COMMERCIAL

## 2023-07-03 DIAGNOSIS — E03.9 HYPOTHYROIDISM, UNSPECIFIED TYPE: ICD-10-CM

## 2023-07-03 NOTE — TELEPHONE ENCOUNTER
Pharmacy calling to confirm and clarify dose for levothyroxine. Pt just received the 75 mcg but they also have order for 200 mcg. Is patient supposed to take both or just the 200 mcg?     Routing to PCP to confirm. Unclear exactly how much pt should be taking.

## 2023-07-05 RX ORDER — LEVOTHYROXINE SODIUM 200 UG/1
200 TABLET ORAL DAILY
Qty: 90 TABLET | Refills: 1 | Status: SHIPPED | OUTPATIENT
Start: 2023-07-05 | End: 2023-11-27

## 2023-07-05 NOTE — TELEPHONE ENCOUNTER
Patient calling back from a missed call     Writer relayed the message and the patient will only take the 200 mcg Levo

## 2023-07-05 NOTE — TELEPHONE ENCOUNTER
On phone for over 10-15 min and was unable to connect with a pharmacy staff to relay below from PCP. If pharmacy calls back in regards to this- please inform them of below.     Left voicemail for patient as well- if patient returns call please inform her that we were unable to reach someone at the [facility] pharmacy. PCP confirmed pt should be taking only 200 mcg of levothyroxine.     Ivonne Briggs,   [facility] Support Pool 1 hour ago (8:22 AM)     VS  Sorry- cancel the 75 mcg of levo, only 200 mcg of levothyroxine PO every day for 90 days x 3 refills.     Ivonne BYNUM. [place], DO

## 2023-07-12 ENCOUNTER — LAB (OUTPATIENT)
Dept: LAB | Facility: CLINIC | Age: 58
End: 2023-07-12
Payer: COMMERCIAL

## 2023-07-12 DIAGNOSIS — E11.22 TYPE 2 DIABETES MELLITUS WITH STAGE 2 CHRONIC KIDNEY DISEASE, WITHOUT LONG-TERM CURRENT USE OF INSULIN (H): ICD-10-CM

## 2023-07-12 DIAGNOSIS — N18.2 TYPE 2 DIABETES MELLITUS WITH STAGE 2 CHRONIC KIDNEY DISEASE, WITHOUT LONG-TERM CURRENT USE OF INSULIN (H): ICD-10-CM

## 2023-07-12 DIAGNOSIS — E03.9 HYPOTHYROIDISM, UNSPECIFIED TYPE: ICD-10-CM

## 2023-07-12 DIAGNOSIS — I10 ESSENTIAL HYPERTENSION: ICD-10-CM

## 2023-07-12 LAB
ANION GAP SERPL CALCULATED.3IONS-SCNC: 15 MMOL/L (ref 7–15)
BUN SERPL-MCNC: 28 MG/DL (ref 6–20)
CALCIUM SERPL-MCNC: 10.3 MG/DL (ref 8.6–10)
CHLORIDE SERPL-SCNC: 100 MMOL/L (ref 98–107)
CREAT SERPL-MCNC: 1.15 MG/DL (ref 0.51–0.95)
DEPRECATED HCO3 PLAS-SCNC: 23 MMOL/L (ref 22–29)
GFR SERPL CREATININE-BSD FRML MDRD: 55 ML/MIN/1.73M2
GLUCOSE SERPL-MCNC: 234 MG/DL (ref 70–99)
HBA1C MFR BLD: 11 % (ref 0–5.6)
POTASSIUM SERPL-SCNC: 4.3 MMOL/L (ref 3.4–5.3)
SODIUM SERPL-SCNC: 138 MMOL/L (ref 136–145)
TSH SERPL DL<=0.005 MIU/L-ACNC: 8.99 UIU/ML (ref 0.3–4.2)

## 2023-07-12 PROCEDURE — 36415 COLL VENOUS BLD VENIPUNCTURE: CPT

## 2023-07-12 PROCEDURE — 84443 ASSAY THYROID STIM HORMONE: CPT

## 2023-07-12 PROCEDURE — 83036 HEMOGLOBIN GLYCOSYLATED A1C: CPT

## 2023-07-12 PROCEDURE — 80048 BASIC METABOLIC PNL TOTAL CA: CPT

## 2023-07-17 DIAGNOSIS — E03.9 HYPOTHYROIDISM, UNSPECIFIED TYPE: Primary | ICD-10-CM

## 2023-07-17 RX ORDER — LEVOTHYROXINE SODIUM 25 UG/1
25 TABLET ORAL DAILY
Qty: 90 TABLET | Refills: 0 | Status: SHIPPED | OUTPATIENT
Start: 2023-07-17 | End: 2023-10-16

## 2023-07-17 NOTE — RESULT ENCOUNTER NOTE
Hello -    Here are my comments about the recent results. Your kidney function is stable. Your blood sugar has gone up, but we will do the increased rybelsus to see if it helps. It appears that we cut down the thyroid dose too much. I will add 25 mcg for you to take and recheck in about 8 weeks.       Please let us know if you have any questions or concerns.     Regards,  Ivonne Briggs, DO

## 2023-07-18 DIAGNOSIS — I10 ESSENTIAL HYPERTENSION: ICD-10-CM

## 2023-07-18 NOTE — TELEPHONE ENCOUNTER
Refill request from pharmacy via fax for lisinopril       Alfredo Jimenez Jr., CMA on 7/18/2023 at 3:29 PM

## 2023-07-19 RX ORDER — LISINOPRIL 10 MG/1
10 TABLET ORAL DAILY
Qty: 30 TABLET | Refills: 1 | Status: SHIPPED | OUTPATIENT
Start: 2023-07-19 | End: 2023-08-15

## 2023-07-19 NOTE — TELEPHONE ENCOUNTER
"Routing refill request to provider for review/approval because:  Labs out of range:  Creat  Blood pressure readings out of range    Last Written Prescription Date:  5/24/2023  Last Fill Quantity: 30,  # refills: 1   Last office visit provider:  5/24/2023     Requested Prescriptions   Pending Prescriptions Disp Refills    lisinopril (ZESTRIL) 10 MG tablet 30 tablet 1     Sig: Take 1 tablet (10 mg) by mouth daily       ACE Inhibitors (Including Combos) Protocol Failed - 7/19/2023 10:11 AM        Failed - Blood pressure under 140/90 in past 12 months     BP Readings from Last 3 Encounters:   05/24/23 (!) 132/92   04/04/23 (!) 138/92   09/07/21 126/78                 Failed - Normal serum creatinine on file in past 12 months     Recent Labs   Lab Test 07/12/23  0912   CR 1.15*       Ok to refill medication if creatinine is low          Passed - Recent (12 mo) or future (30 days) visit within the authorizing provider's specialty     Patient has had an office visit with the authorizing provider or a provider within the authorizing providers department within the previous 12 mos or has a future within next 30 days. See \"Patient Info\" tab in inbasket, or \"Choose Columns\" in Meds & Orders section of the refill encounter.              Passed - Medication is active on med list        Passed - Patient is age 18 or older        Passed - No active pregnancy on record        Passed - Normal serum potassium on file in past 12 months     Recent Labs   Lab Test 07/12/23  0912   POTASSIUM 4.3             Passed - No positive pregnancy test within past 12 months             CARLITOS DE JESUS RN 07/19/23 10:11 AM  "

## 2023-07-27 DIAGNOSIS — F51.01 PRIMARY INSOMNIA: ICD-10-CM

## 2023-07-27 NOTE — TELEPHONE ENCOUNTER
"Routing refill request to provider for review/approval because:  Refill too soon    Last Written Prescription Date:  5/11/2023  Last Fill Quantity: 180,  # refills: 0   Last office visit provider:  5/24/2023     Requested Prescriptions   Pending Prescriptions Disp Refills    traZODone (DESYREL) 50 MG tablet 180 tablet 0     Sig: TAKE 1 TO 2 TABLETS (50-100MG TOTAL) AT BEDTIME Strength: 50 mg       Serotonin Modulators Passed - 7/27/2023  9:57 AM        Passed - Recent (12 mo) or future (30 days) visit within the authorizing provider's specialty     Patient has had an office visit with the authorizing provider or a provider within the authorizing providers department within the previous 12 mos or has a future within next 30 days. See \"Patient Info\" tab in inbasket, or \"Choose Columns\" in Meds & Orders section of the refill encounter.              Passed - Medication is active on med list        Passed - Patient is age 18 or older        Passed - No active pregnancy on record        Passed - No positive pregnancy test in past 12 months             Alba Godinez RN 07/27/23 3:16 PM  "

## 2023-07-27 NOTE — TELEPHONE ENCOUNTER
Received fax from pharmacy requesting refill for trazodone    Last refill: 05/11/2023  Patient last seen: 05/24/2023

## 2023-07-28 RX ORDER — TRAZODONE HYDROCHLORIDE 50 MG/1
TABLET, FILM COATED ORAL
Qty: 180 TABLET | Refills: 0 | Status: SHIPPED | OUTPATIENT
Start: 2023-07-28 | End: 2023-10-19

## 2023-08-03 DIAGNOSIS — E11.22 TYPE 2 DIABETES MELLITUS WITH STAGE 3 CHRONIC KIDNEY DISEASE, WITHOUT LONG-TERM CURRENT USE OF INSULIN, UNSPECIFIED WHETHER STAGE 3A OR 3B CKD (H): ICD-10-CM

## 2023-08-03 DIAGNOSIS — N18.30 TYPE 2 DIABETES MELLITUS WITH STAGE 3 CHRONIC KIDNEY DISEASE, WITHOUT LONG-TERM CURRENT USE OF INSULIN, UNSPECIFIED WHETHER STAGE 3A OR 3B CKD (H): ICD-10-CM

## 2023-08-03 RX ORDER — DAPAGLIFLOZIN 5 MG/1
5 TABLET, FILM COATED ORAL DAILY
Qty: 90 TABLET | Refills: 0 | Status: SHIPPED | OUTPATIENT
Start: 2023-08-03 | End: 2023-11-29

## 2023-08-03 NOTE — TELEPHONE ENCOUNTER
Received fax from pharmacy requesting refill for Faxiga    Last refill: 6/5/2023  Patient last seen: 5/24/2023

## 2023-08-03 NOTE — TELEPHONE ENCOUNTER
"Last Written Prescription Date:  6-5-23  Last Fill Quantity: 30,  # refills: 1   Last office visit provider:  5-24-23     Requested Prescriptions   Pending Prescriptions Disp Refills    dapagliflozin (FARXIGA) 5 MG TABS tablet 30 tablet 1     Sig: Take 1 tablet (5 mg) by mouth daily       Sodium Glucose Co-Transport Inhibitor Agents Passed - 8/3/2023  4:29 PM        Passed - Patient has documented A1c within the specified period of time.     If HgbA1C is 8 or greater, it needs to be on file within the past 3 months.  If less than 8, must be on file within the past 6 months.     Recent Labs   Lab Test 07/12/23 0912   A1C 11.0*             Passed - No creatinine >1.4 or GFR <45 within the past 12 mos     Recent Labs   Lab Test 07/12/23  0912 08/05/21  1724 03/12/21  0852   GFRESTIMATED 55*   < > >60   GFRESTBLACK  --   --  >60    < > = values in this interval not displayed.       Recent Labs   Lab Test 07/12/23 0912   CR 1.15*             Passed - Medication is active on med list        Passed - Patient is age 18 or older        Passed - Patient is not pregnant        Passed - Patient has documented normal Potassium within the last 12 mos.     Recent Labs   Lab Test 07/12/23 0912   POTASSIUM 4.3             Passed - Patient has no positive pregnancy test within the past 12 mos.        Passed - Recent (6 mo) or future (30 days) visit within the authorizing provider's specialty     Patient had office visit in the last 6 months or has a visit in the next 30 days with authorizing provider or within the authorizing provider's specialty.  See \"Patient Info\" tab in inbasket, or \"Choose Columns\" in Meds & Orders section of the refill encounter.                 Carissa Osborne RN 08/03/23 4:35 PM  "

## 2023-08-09 DIAGNOSIS — I10 PRIMARY HYPERTENSION: ICD-10-CM

## 2023-08-11 RX ORDER — TRIAMTERENE AND HYDROCHLOROTHIAZIDE 37.5; 25 MG/1; MG/1
1 CAPSULE ORAL DAILY
Qty: 90 CAPSULE | Refills: 1 | Status: SHIPPED | OUTPATIENT
Start: 2023-08-11 | End: 2024-02-05

## 2023-08-15 DIAGNOSIS — I10 ESSENTIAL HYPERTENSION: ICD-10-CM

## 2023-08-16 ENCOUNTER — MYC MEDICAL ADVICE (OUTPATIENT)
Dept: FAMILY MEDICINE | Facility: CLINIC | Age: 58
End: 2023-08-16
Payer: COMMERCIAL

## 2023-08-16 VITALS — DIASTOLIC BLOOD PRESSURE: 69 MMHG | SYSTOLIC BLOOD PRESSURE: 139 MMHG

## 2023-08-16 DIAGNOSIS — E11.65 TYPE 2 DIABETES MELLITUS WITH HYPERGLYCEMIA, WITHOUT LONG-TERM CURRENT USE OF INSULIN (H): Primary | ICD-10-CM

## 2023-08-16 RX ORDER — LISINOPRIL 10 MG/1
10 TABLET ORAL DAILY
Qty: 90 TABLET | Refills: 0 | Status: SHIPPED | OUTPATIENT
Start: 2023-08-16 | End: 2023-12-07

## 2023-08-16 NOTE — TELEPHONE ENCOUNTER
"Routing refill request to provider for review/approval because:  Labs out of range:  normal creatinine and b/p out of range.    Last Written Prescription Date:  7-19-23  Last Fill Quantity: 30,  # refills: 1   Last office visit provider:  5-24-23     Requested Prescriptions   Pending Prescriptions Disp Refills    lisinopril (ZESTRIL) 10 MG tablet 30 tablet 1     Sig: Take 1 tablet (10 mg) by mouth daily       ACE Inhibitors (Including Combos) Protocol Failed - 8/15/2023  6:31 PM        Failed - Blood pressure under 140/90 in past 12 months     BP Readings from Last 3 Encounters:   05/24/23 (!) 132/92   04/04/23 (!) 138/92   09/07/21 126/78                 Failed - Normal serum creatinine on file in past 12 months     Recent Labs   Lab Test 07/12/23  0912   CR 1.15*       Ok to refill medication if creatinine is low          Passed - Recent (12 mo) or future (30 days) visit within the authorizing provider's specialty     Patient has had an office visit with the authorizing provider or a provider within the authorizing providers department within the previous 12 mos or has a future within next 30 days. See \"Patient Info\" tab in inbasket, or \"Choose Columns\" in Meds & Orders section of the refill encounter.              Passed - Medication is active on med list        Passed - Patient is age 18 or older        Passed - No active pregnancy on record        Passed - Normal serum potassium on file in past 12 months     Recent Labs   Lab Test 07/12/23  0912   POTASSIUM 4.3             Passed - No positive pregnancy test within past 12 months           Aby Saldaña RN  [facility]    Aby Saldaña RN 08/16/23 9:27 AM  "

## 2023-10-16 DIAGNOSIS — E03.9 HYPOTHYROIDISM, UNSPECIFIED TYPE: ICD-10-CM

## 2023-10-17 RX ORDER — LEVOTHYROXINE SODIUM 25 UG/1
25 TABLET ORAL DAILY
Qty: 90 TABLET | Refills: 0 | Status: SHIPPED | OUTPATIENT
Start: 2023-10-17 | End: 2024-01-16

## 2023-10-19 DIAGNOSIS — F51.01 PRIMARY INSOMNIA: ICD-10-CM

## 2023-10-19 RX ORDER — TRAZODONE HYDROCHLORIDE 50 MG/1
TABLET, FILM COATED ORAL
Qty: 180 TABLET | Refills: 1 | Status: SHIPPED | OUTPATIENT
Start: 2023-10-19 | End: 2024-04-21

## 2023-11-20 ASSESSMENT — ASTHMA QUESTIONNAIRES: ACT_TOTALSCORE: 25

## 2023-11-27 ENCOUNTER — OFFICE VISIT (OUTPATIENT)
Dept: FAMILY MEDICINE | Facility: CLINIC | Age: 58
End: 2023-11-27
Payer: COMMERCIAL

## 2023-11-27 VITALS
SYSTOLIC BLOOD PRESSURE: 138 MMHG | RESPIRATION RATE: 16 BRPM | WEIGHT: 284 LBS | BODY MASS INDEX: 48.49 KG/M2 | OXYGEN SATURATION: 97 % | TEMPERATURE: 98.5 F | DIASTOLIC BLOOD PRESSURE: 70 MMHG | HEIGHT: 64 IN | HEART RATE: 93 BPM

## 2023-11-27 DIAGNOSIS — E03.9 HYPOTHYROIDISM, UNSPECIFIED TYPE: ICD-10-CM

## 2023-11-27 DIAGNOSIS — Z23 ENCOUNTER FOR VACCINATION: ICD-10-CM

## 2023-11-27 DIAGNOSIS — E11.65 TYPE 2 DIABETES MELLITUS WITH HYPERGLYCEMIA, WITHOUT LONG-TERM CURRENT USE OF INSULIN (H): Primary | ICD-10-CM

## 2023-11-27 LAB
HBA1C MFR BLD: 8.8 % (ref 0–5.6)
TSH SERPL DL<=0.005 MIU/L-ACNC: 1.11 UIU/ML (ref 0.3–4.2)

## 2023-11-27 PROCEDURE — 90480 ADMN SARSCOV2 VAC 1/ONLY CMP: CPT | Performed by: FAMILY MEDICINE

## 2023-11-27 PROCEDURE — 90471 IMMUNIZATION ADMIN: CPT | Performed by: FAMILY MEDICINE

## 2023-11-27 PROCEDURE — 84443 ASSAY THYROID STIM HORMONE: CPT | Performed by: FAMILY MEDICINE

## 2023-11-27 PROCEDURE — 91320 SARSCV2 VAC 30MCG TRS-SUC IM: CPT | Performed by: FAMILY MEDICINE

## 2023-11-27 PROCEDURE — 36415 COLL VENOUS BLD VENIPUNCTURE: CPT | Performed by: FAMILY MEDICINE

## 2023-11-27 PROCEDURE — 99214 OFFICE O/P EST MOD 30 MIN: CPT | Mod: 25 | Performed by: FAMILY MEDICINE

## 2023-11-27 PROCEDURE — 83036 HEMOGLOBIN GLYCOSYLATED A1C: CPT | Performed by: FAMILY MEDICINE

## 2023-11-27 PROCEDURE — 90686 IIV4 VACC NO PRSV 0.5 ML IM: CPT | Performed by: FAMILY MEDICINE

## 2023-11-27 RX ORDER — AMOXICILLIN 500 MG/1
CAPSULE ORAL
COMMUNITY
Start: 2023-08-02 | End: 2023-11-27

## 2023-11-27 NOTE — PROGRESS NOTES
"  Assessment & Plan     Type 2 diabetes mellitus with hyperglycemia, without long-term current use of insulin (H)  Chronic, previously above goal. Patient goal to get below 7. Medication is limited due to patient phobia of needles. If down trending, still consider increasing dose of farxiga amaryl.   - Hemoglobin A1c; Future    Hypothyroidism, unspecified type  Chronic, elevated TSH due to 50 mcg per day cut. Patient's current dose at 225 mcg per day, will check to see if appropriate.   - TSH with free T4 reflex; Future    Encounter for vaccination  - COVID-19 12+ (2023-24) (PFIZER)  - INFLUENZA VACCINE >6 MONTHS (AFLURIA/FLUZONE)    Ordering of each unique test  Prescription drug management  I spent a total of 25 minutes on the day of the visit.   Time spent by me doing chart review, history and exam, documentation and further activities per the note       BMI:   Estimated body mass index is 49.13 kg/m  as calculated from the following:    Height as of this encounter: 1.619 m (5' 3.75\").    Weight as of this encounter: 128.8 kg (284 lb).   Weight management plan: Discussed healthy diet and exercise guidelines    See Patient Instructions    DO RAYNA [facility]    Americo Dias is a 58 year old, presenting for the following health issues:  Follow Up and Recheck Medication        11/27/2023     8:07 AM   Additional Questions   Roomed by Gracie Arteaga   Accompanied by N/A       History of Present Illness       Diabetes:   She presents for follow up of diabetes.    She is not checking blood glucose.         She has no concerns regarding her diabetes at this time.   She is not experiencing numbness or burning in feet, excessive thirst, blurry vision, weight changes or redness, sores or blisters on feet. The patient has not had a diabetic eye exam in the last 12 months.          She eats 0-1 servings of fruits and vegetables daily.She consumes 1 sweetened beverage(s) daily.She " "exercises with enough effort to increase her heart rate 9 or less minutes per day.  She exercises with enough effort to increase her heart rate 3 or less days per week. She is missing 1 dose(s) of medications per week.  She is not taking prescribed medications regularly due to other.     Patient feels her energy levels are improved, not fatigued. Patient has a hard time sleeping.     No issues with her feet, denies vomiting, or constipation.     Wt Readings from Last 5 Encounters:   11/27/23 128.8 kg (284 lb)   05/24/23 131.5 kg (289 lb 12.8 oz)   04/04/23 133.3 kg (293 lb 12.8 oz)   09/07/21 129.7 kg (286 lb)   08/05/21 133.6 kg (294 lb 9.6 oz)     Review of Systems   Constitutional, HEENT, cardiovascular, pulmonary, gi and gu systems are negative, except as otherwise noted.      Objective    /70 (BP Location: Right arm, Patient Position: Sitting, Cuff Size: Adult Large)   Pulse 93   Temp 98.5  F (36.9  C) (Oral)   Resp 16   Ht 1.619 m (5' 3.75\")   Wt 128.8 kg (284 lb)   LMP  (LMP Unknown)   SpO2 97%   BMI 49.13 kg/m    Body mass index is 49.13 kg/m .  Physical Exam   GENERAL: healthy, alert and no distress  EYES: Eyes grossly normal to inspection, PERRL and conjunctivae and sclerae normal  NECK: no adenopathy, no asymmetry, masses, or scars and thyroid normal to palpation  MS: no gross musculoskeletal defects noted, no edema  SKIN: no suspicious lesions or rashes  PSYCH: mentation appears normal, affect normal/bright    No results found for this or any previous visit (from the past 24 hour(s)).                "

## 2023-11-27 NOTE — PATIENT INSTRUCTIONS
Good job on the weight loss!!! Please see the [facility] doctors for the eye exams.   Good job on the vaccines! Let me know if you end up doing the RSV, but I do not think it is necessary at this point in time.   We will re-check your thyroid today.

## 2023-11-27 NOTE — RESULT ENCOUNTER NOTE
"Hello -    Here are my comments about the recent results. FANTASTIC JOB!!!! You are doing wonderfully with the weight loss and diabetes control. You are very close to getting your A1c below 7 or so, which is the \"strict\" goal, but the goal to get to to minimize our risk of heart attack and stroke. You technically have room to go up on your farxiga (the medicine that makes you pee out excess sugar) or the amaryl (this can cause weight gain, sometimes low blood sugars). We can go up on one or just continue on and monitor, let me know your thoughts. Either way, we should recheck in 3 months.     Please let us know if you have any questions or concerns.    Regards,  ZEV DOCKERY, DO "

## 2023-11-27 NOTE — RESULT ENCOUNTER NOTE
Hello -    Here are my comments about the recent results. The thyroid went back to normal. It looks like 225 mcg is the magic number. Since you are losing weight, that can also effect how much we may need, so please let me know if you are experiencing any of your old symptoms again!     Please let us know if you have any questions or concerns.    Regards,  ZEV DOCKERY, DO

## 2023-11-28 ENCOUNTER — MYC MEDICAL ADVICE (OUTPATIENT)
Dept: FAMILY MEDICINE | Facility: CLINIC | Age: 58
End: 2023-11-28
Payer: COMMERCIAL

## 2023-11-28 DIAGNOSIS — E11.65 TYPE 2 DIABETES MELLITUS WITH HYPERGLYCEMIA, WITHOUT LONG-TERM CURRENT USE OF INSULIN (H): Primary | ICD-10-CM

## 2023-11-29 RX ORDER — DAPAGLIFLOZIN 10 MG/1
10 TABLET, FILM COATED ORAL DAILY
Qty: 90 TABLET | Refills: 1 | Status: SHIPPED | OUTPATIENT
Start: 2023-11-29 | End: 2023-12-08

## 2023-12-06 ENCOUNTER — TELEPHONE (OUTPATIENT)
Dept: FAMILY MEDICINE | Facility: CLINIC | Age: 58
End: 2023-12-06

## 2023-12-07 ENCOUNTER — MYC REFILL (OUTPATIENT)
Dept: FAMILY MEDICINE | Facility: CLINIC | Age: 58
End: 2023-12-07
Payer: COMMERCIAL

## 2023-12-07 ENCOUNTER — MYC REFILL (OUTPATIENT)
Dept: INTERNAL MEDICINE | Facility: CLINIC | Age: 58
End: 2023-12-07
Payer: COMMERCIAL

## 2023-12-07 DIAGNOSIS — E11.9 DIABETES (H): ICD-10-CM

## 2023-12-07 DIAGNOSIS — I10 ESSENTIAL HYPERTENSION: ICD-10-CM

## 2023-12-07 DIAGNOSIS — E11.65 TYPE 2 DIABETES MELLITUS WITH HYPERGLYCEMIA, WITHOUT LONG-TERM CURRENT USE OF INSULIN (H): ICD-10-CM

## 2023-12-07 RX ORDER — DAPAGLIFLOZIN 10 MG/1
10 TABLET, FILM COATED ORAL DAILY
Qty: 90 TABLET | Refills: 1 | Status: CANCELLED | OUTPATIENT
Start: 2023-12-07

## 2023-12-08 RX ORDER — DAPAGLIFLOZIN 10 MG/1
10 TABLET, FILM COATED ORAL DAILY
Qty: 90 TABLET | Refills: 0 | Status: SHIPPED | OUTPATIENT
Start: 2023-12-08 | End: 2023-12-20

## 2023-12-08 RX ORDER — LISINOPRIL 10 MG/1
10 TABLET ORAL DAILY
Qty: 90 TABLET | Refills: 0 | Status: SHIPPED | OUTPATIENT
Start: 2023-12-08 | End: 2024-03-07

## 2023-12-11 NOTE — TELEPHONE ENCOUNTER
Prior Authorization Approval    Medication: RYBELSUS 14 MG PO TABS  Authorization Effective Date: 12/11/2023  Authorization Expiration Date: 12/10/2026  Approved Dose/Quantity:   Reference #:     Insurance Company: [employer] - Phone [phone] Fax [phone]  Expected CoPay: $    CoPay Card Available:      Financial Assistance Needed:   Which Pharmacy is filling the prescription: [facility]/PHARMACY #[id number] [place], [place] - [address] [place] [address]  Pharmacy Notified: Yes  Patient Notified: **Instructed pharmacy to notify patient when script is ready to /ship.**

## 2023-12-11 NOTE — TELEPHONE ENCOUNTER
Central Prior Authorization Team  Phone: [phone]    PA Initiation    Medication: RYBELSUS 14 MG PO TABS  Insurance Company: [employer] - Phone [phone] Fax [phone]  [id number] [place], [place] - [address]  Filling Pharmacy Phone: [phone]  Filling Pharmacy Fax:    Start Date: 12/11/2023

## 2023-12-13 ENCOUNTER — TELEPHONE (OUTPATIENT)
Dept: FAMILY MEDICINE | Facility: CLINIC | Age: 58
End: 2023-12-13
Payer: COMMERCIAL

## 2023-12-15 NOTE — TELEPHONE ENCOUNTER
PA Initiation    Medication: DAPAGLIFLOZIN PROPANEDIOL 10 MG PO TABS  Insurance Company: [employer] - Phone [phone] Fax [phone]  Pharmacy [facility]illing the Rx[facility] [place] [facility] - [address] [facility] AT [address] TO [facility] [address]  Filling Pharmacy Phone: [phone]  Filling Pharmacy Fax:    Start Date: 12/15/2023

## 2023-12-18 ENCOUNTER — MYC MEDICAL ADVICE (OUTPATIENT)
Dept: FAMILY MEDICINE | Facility: CLINIC | Age: 58
End: 2023-12-18
Payer: COMMERCIAL

## 2023-12-18 DIAGNOSIS — E11.65 TYPE 2 DIABETES MELLITUS WITH HYPERGLYCEMIA, WITHOUT LONG-TERM CURRENT USE OF INSULIN (H): ICD-10-CM

## 2023-12-19 NOTE — TELEPHONE ENCOUNTER
Prior Authorization Approval    Medication: DAPAGLIFLOZIN PROPANEDIOL 10 MG PO TABS  Authorization Effective Date: 12/19/2023  Authorization Expiration Date: 12/18/2026  Approved Dose/Quantity:   Reference #:     Insurance Company: [employer] - Phone [phone] Fax [phone]  Expected CoPay: $    CoPay Card Available:      Financial Assistance Needed:   Which Pharmacy is filling the prescription: [facility] - [place] - [address] [facility] AT [address]AL TO [facility]  Pharmacy Notified: Yes  Patient Notified: Yes

## 2023-12-20 RX ORDER — DAPAGLIFLOZIN 10 MG/1
10 TABLET, FILM COATED ORAL DAILY
Qty: 90 TABLET | Refills: 0 | Status: SHIPPED | OUTPATIENT
Start: 2023-12-20 | End: 2024-03-12

## 2023-12-20 NOTE — ADDENDUM NOTE
Addended by: COREEN PATEL on: 12/20/2023 12:42 PM     Modules accepted: Orders     Render Risk Assessment In Note?: no Additional Notes: Patient discussed having removed by plastics. Okay to send referral if required Detail Level: Detailed

## 2024-01-16 DIAGNOSIS — E03.9 HYPOTHYROIDISM, UNSPECIFIED TYPE: ICD-10-CM

## 2024-01-16 RX ORDER — LEVOTHYROXINE SODIUM 25 UG/1
25 TABLET ORAL DAILY
Qty: 90 TABLET | Refills: 2 | Status: SHIPPED | OUTPATIENT
Start: 2024-01-16 | End: 2024-04-29

## 2024-02-05 DIAGNOSIS — I10 PRIMARY HYPERTENSION: ICD-10-CM

## 2024-02-05 RX ORDER — TRIAMTERENE AND HYDROCHLOROTHIAZIDE 37.5; 25 MG/1; MG/1
1 CAPSULE ORAL DAILY
Qty: 90 CAPSULE | Refills: 1 | Status: SHIPPED | OUTPATIENT
Start: 2024-02-05 | End: 2024-08-05

## 2024-03-05 ENCOUNTER — PATIENT OUTREACH (OUTPATIENT)
Dept: CARE COORDINATION | Facility: CLINIC | Age: 59
End: 2024-03-05
Payer: COMMERCIAL

## 2024-03-07 DIAGNOSIS — I10 ESSENTIAL HYPERTENSION: ICD-10-CM

## 2024-03-08 RX ORDER — LISINOPRIL 10 MG/1
10 TABLET ORAL DAILY
Qty: 90 TABLET | Refills: 0 | Status: SHIPPED | OUTPATIENT
Start: 2024-03-08 | End: 2024-04-23

## 2024-03-12 DIAGNOSIS — E11.65 TYPE 2 DIABETES MELLITUS WITH HYPERGLYCEMIA, WITHOUT LONG-TERM CURRENT USE OF INSULIN (H): ICD-10-CM

## 2024-03-12 RX ORDER — DAPAGLIFLOZIN 10 MG/1
10 TABLET, FILM COATED ORAL DAILY
Qty: 30 TABLET | Refills: 0 | Status: SHIPPED | OUTPATIENT
Start: 2024-03-12 | End: 2024-04-16

## 2024-03-19 ENCOUNTER — PATIENT OUTREACH (OUTPATIENT)
Dept: CARE COORDINATION | Facility: CLINIC | Age: 59
End: 2024-03-19
Payer: COMMERCIAL

## 2024-04-08 ENCOUNTER — TRANSFERRED RECORDS (OUTPATIENT)
Dept: MULTI SPECIALTY CLINIC | Facility: CLINIC | Age: 59
End: 2024-04-08
Payer: COMMERCIAL

## 2024-04-08 LAB — RETINOPATHY: NORMAL

## 2024-04-15 ENCOUNTER — MYC MEDICAL ADVICE (OUTPATIENT)
Dept: FAMILY MEDICINE | Facility: CLINIC | Age: 59
End: 2024-04-15
Payer: COMMERCIAL

## 2024-04-15 DIAGNOSIS — E11.65 TYPE 2 DIABETES MELLITUS WITH HYPERGLYCEMIA, WITHOUT LONG-TERM CURRENT USE OF INSULIN (H): ICD-10-CM

## 2024-04-16 RX ORDER — DAPAGLIFLOZIN 10 MG/1
10 TABLET, FILM COATED ORAL DAILY
Qty: 30 TABLET | Refills: 0 | Status: SHIPPED | OUTPATIENT
Start: 2024-04-16 | End: 2024-05-14

## 2024-04-21 DIAGNOSIS — F51.01 PRIMARY INSOMNIA: ICD-10-CM

## 2024-04-22 RX ORDER — TRAZODONE HYDROCHLORIDE 50 MG/1
TABLET, FILM COATED ORAL
Qty: 180 TABLET | Refills: 1 | Status: SHIPPED | OUTPATIENT
Start: 2024-04-22

## 2024-04-23 ENCOUNTER — ORDERS ONLY (AUTO-RELEASED) (OUTPATIENT)
Dept: FAMILY MEDICINE | Facility: CLINIC | Age: 59
End: 2024-04-23

## 2024-04-23 ENCOUNTER — OFFICE VISIT (OUTPATIENT)
Dept: FAMILY MEDICINE | Facility: CLINIC | Age: 59
End: 2024-04-23
Attending: FAMILY MEDICINE
Payer: COMMERCIAL

## 2024-04-23 VITALS
HEART RATE: 85 BPM | DIASTOLIC BLOOD PRESSURE: 82 MMHG | RESPIRATION RATE: 18 BRPM | BODY MASS INDEX: 49.22 KG/M2 | TEMPERATURE: 98.5 F | OXYGEN SATURATION: 98 % | HEIGHT: 64 IN | WEIGHT: 288.3 LBS | SYSTOLIC BLOOD PRESSURE: 138 MMHG

## 2024-04-23 DIAGNOSIS — I10 ESSENTIAL HYPERTENSION: ICD-10-CM

## 2024-04-23 DIAGNOSIS — E11.22 TYPE 2 DIABETES MELLITUS WITH STAGE 3 CHRONIC KIDNEY DISEASE, UNSPECIFIED WHETHER LONG TERM INSULIN USE, UNSPECIFIED WHETHER STAGE 3A OR 3B CKD (H): Primary | ICD-10-CM

## 2024-04-23 DIAGNOSIS — E66.01 MORBID OBESITY (H): ICD-10-CM

## 2024-04-23 DIAGNOSIS — N18.30 TYPE 2 DIABETES MELLITUS WITH STAGE 3 CHRONIC KIDNEY DISEASE, UNSPECIFIED WHETHER LONG TERM INSULIN USE, UNSPECIFIED WHETHER STAGE 3A OR 3B CKD (H): Primary | ICD-10-CM

## 2024-04-23 DIAGNOSIS — Z12.11 SCREEN FOR COLON CANCER: ICD-10-CM

## 2024-04-23 DIAGNOSIS — J31.0 CHRONIC RHINITIS: ICD-10-CM

## 2024-04-23 LAB
CHOLEST SERPL-MCNC: 158 MG/DL
CREAT UR-MCNC: 48 MG/DL
FASTING STATUS PATIENT QL REPORTED: YES
HDLC SERPL-MCNC: 45 MG/DL
HGB BLD-MCNC: 13.6 G/DL (ref 11.7–15.7)
LDLC SERPL CALC-MCNC: 81 MG/DL
MICROALBUMIN UR-MCNC: <12 MG/L
MICROALBUMIN/CREAT UR: NORMAL MG/G{CREAT}
NONHDLC SERPL-MCNC: 113 MG/DL
TRIGL SERPL-MCNC: 161 MG/DL

## 2024-04-23 PROCEDURE — 36415 COLL VENOUS BLD VENIPUNCTURE: CPT | Performed by: FAMILY MEDICINE

## 2024-04-23 PROCEDURE — 82570 ASSAY OF URINE CREATININE: CPT | Performed by: FAMILY MEDICINE

## 2024-04-23 PROCEDURE — 99214 OFFICE O/P EST MOD 30 MIN: CPT | Performed by: FAMILY MEDICINE

## 2024-04-23 PROCEDURE — 82043 UR ALBUMIN QUANTITATIVE: CPT | Performed by: FAMILY MEDICINE

## 2024-04-23 PROCEDURE — 80061 LIPID PANEL: CPT | Performed by: FAMILY MEDICINE

## 2024-04-23 PROCEDURE — 85018 HEMOGLOBIN: CPT | Performed by: FAMILY MEDICINE

## 2024-04-23 RX ORDER — AZELASTINE 1 MG/ML
1 SPRAY, METERED NASAL 2 TIMES DAILY
Qty: 30 ML | Refills: 1 | Status: SHIPPED | OUTPATIENT
Start: 2024-04-23

## 2024-04-23 RX ORDER — LISINOPRIL 10 MG/1
10 TABLET ORAL DAILY
Qty: 90 TABLET | Refills: 3 | Status: SHIPPED | OUTPATIENT
Start: 2024-04-23

## 2024-04-23 ASSESSMENT — PAIN SCALES - GENERAL: PAINLEVEL: NO PAIN (0)

## 2024-04-23 NOTE — PROGRESS NOTES
Assessment & Plan     Type 2 diabetes mellitus with stage 3 chronic kidney disease, unspecified whether long term insulin use, unspecified whether stage 3a or 3b CKD (H)  Chronic, pending labs. Per patient, patient has been feeling well and not experiencing significant side effects. Will recheck hemoglobin today.   - Lipid panel reflex to direct LDL Non-fasting  - Albumin Random Urine Quantitative with Creat Ratio  - REVIEW OF HEALTH MAINTENANCE PROTOCOL ORDERS  - Hemoglobin  - metFORMIN (GLUCOPHAGE) 1000 MG tablet  Dispense: 180 tablet; Refill: 3  - Lipid panel reflex to direct LDL Non-fasting  - Albumin Random Urine Quantitative with Creat Ratio  - Hemoglobin    Essential hypertension  Chronic, within goal at home. Discussed goal Bps when at rest, and reassured that elevation with activity is physiologic.   - lisinopril (ZESTRIL) 10 MG tablet  Dispense: 90 tablet; Refill: 3    Morbid obesity (H)  Chronic, stable. Unclear if oral rysbelsus has benefit, so if patient were to desire weight loss would recommend injectable.     Screen for colon cancer  - JANELLE([employer])    Chronic rhinitis  Chronic, likely cause of chronic cough. Unclear if allergic versus non allergic. Will trial anti histamine nasal spray first. If not improving, recommend Atrovent.   - azelastine (ASTELIN) 0.1 % nasal spray  Dispense: 30 mL; Refill: 1      Prescription drug management  I spent a total of 15 minutes on the day of the visit.   Time spent by me doing chart review, history and exam, documentation and further activities per the note        See Patient Instructions    Americo Dias is a 58 year old, presenting for the following health issues:  Recheck Medication, Diabetes, and Cough (Pt states that coughing and congestion has been present for a few months )        4/23/2024     8:33 AM   Additional Questions   Roomed by DOMENICO Toledo   Accompanied by Self     Via the Health Maintenance questionnaire, the patient has  "reported the following services have been completed -Eye Exam, this information has been sent to the abstraction team.  History of Present Illness       Diabetes:   She presents for follow up of diabetes.    She is not checking blood glucose.         She has no concerns regarding her diabetes at this time.   She is not experiencing numbness or burning in feet, excessive thirst, blurry vision, weight changes or redness, sores or blisters on feet. The patient has had a diabetic eye exam in the last 12 months. Eye exam performed on 04/08/2024. Location of last eye exam [facility] in [facility].        She eats 0-1 servings of fruits and vegetables daily.She consumes 0 sweetened beverage(s) daily.She exercises with enough effort to increase her heart rate 9 or less minutes per day.  She exercises with enough effort to increase her heart rate 4 days per week. She is missing 1 dose(s) of medications per week.  She is not taking prescribed medications regularly due to remembering to take.     Cough  -Chronic with some congestion  -For a month it got better. She has been using cough suppressant which provides relief for several week  -Patient most notes it occurs when she is using her voice a lot and feels congestion when she is laying down  -Reports congestion when laying down  -No sore throat, mucous is clear  -Denies symptoms of acid reflux      Review of Systems  Constitutional, HEENT, cardiovascular, pulmonary, gi and gu systems are negative, except as otherwise noted.      Objective    /82 (BP Location: Right arm, Patient Position: Sitting, Cuff Size: Adult Large)   Pulse 85   Temp 98.5  F (36.9  C) (Oral)   Resp 18   Ht 1.619 m (5' 3.75\")   Wt 130.8 kg (288 lb 4.8 oz)   LMP  (LMP Unknown)   SpO2 98%   BMI 49.88 kg/m    Body mass index is 49.88 kg/m .  Physical Exam   GENERAL: alert and no distress  EYES: Eyes grossly normal to inspection, PERRL and conjunctivae and sclerae normal  HENT: normal " cephalic/atraumatic, ear canals and TM's normal, nasal mucosa edematous , rhinorrhea clear, oral mucous membranes moist, and tonsillar erythema  NECK: no adenopathy, no asymmetry, masses, or scars  MS: no gross musculoskeletal defects noted, no edema  SKIN: no suspicious lesions or rashes    No results found for this or any previous visit (from the past 24 hour(s)).        Signed Electronically by: ZEV DOCKERY DO

## 2024-04-24 NOTE — RESULT ENCOUNTER NOTE
The 10-year ASCVD risk score (Yumiko OLVERA, et al., 2019) is: 7.6%    Values used to calculate the score:      Age: 58 years      Sex: Female      Is Non- : No      Diabetic: Yes      Tobacco smoker: No      Systolic Blood Pressure: 138 mmHg      Is BP treated: Yes      HDL Cholesterol: 45 mg/dL      Total Cholesterol: 158 mg/dL    With the LDL being 81, we can consider rechecking in 6 months to see if we should increase therapy.

## 2024-04-28 DIAGNOSIS — E03.9 HYPOTHYROIDISM, UNSPECIFIED TYPE: ICD-10-CM

## 2024-04-29 RX ORDER — LEVOTHYROXINE SODIUM 25 UG/1
25 TABLET ORAL DAILY
Qty: 90 TABLET | Refills: 2 | OUTPATIENT
Start: 2024-04-29

## 2024-05-08 ENCOUNTER — MYC MEDICAL ADVICE (OUTPATIENT)
Dept: FAMILY MEDICINE | Facility: CLINIC | Age: 59
End: 2024-05-08
Payer: COMMERCIAL

## 2024-05-08 DIAGNOSIS — E03.9 HYPOTHYROIDISM, UNSPECIFIED TYPE: Primary | ICD-10-CM

## 2024-05-09 NOTE — TELEPHONE ENCOUNTER
Per 11/27/23 notes and result notes          Patient is on levothyroxine 225 mcg. There is only a script for the 25 mcg and not the 200 mcg.    Routing to RN's to see if this can be sent in or if it has to wait for PCP

## 2024-05-11 LAB — NONINV COLON CA DNA+OCC BLD SCRN STL QL: NEGATIVE

## 2024-05-13 RX ORDER — LEVOTHYROXINE SODIUM 200 UG/1
200 TABLET ORAL DAILY
Qty: 90 TABLET | Refills: 2 | Status: SHIPPED | OUTPATIENT
Start: 2024-05-13

## 2024-05-14 DIAGNOSIS — E11.65 TYPE 2 DIABETES MELLITUS WITH HYPERGLYCEMIA, WITHOUT LONG-TERM CURRENT USE OF INSULIN (H): ICD-10-CM

## 2024-05-14 DIAGNOSIS — G43.111 INTRACTABLE MIGRAINE WITH AURA WITH STATUS MIGRAINOSUS: ICD-10-CM

## 2024-05-14 RX ORDER — DAPAGLIFLOZIN 10 MG/1
10 TABLET, FILM COATED ORAL DAILY
Qty: 30 TABLET | Refills: 0 | Status: SHIPPED | OUTPATIENT
Start: 2024-05-14 | End: 2024-06-12

## 2024-05-14 RX ORDER — SUMATRIPTAN 50 MG/1
TABLET, FILM COATED ORAL
Qty: 9 TABLET | Refills: 11 | Status: SHIPPED | OUTPATIENT
Start: 2024-05-14

## 2024-05-18 ENCOUNTER — HEALTH MAINTENANCE LETTER (OUTPATIENT)
Age: 59
End: 2024-05-18

## 2024-06-12 DIAGNOSIS — E11.65 TYPE 2 DIABETES MELLITUS WITH HYPERGLYCEMIA, WITHOUT LONG-TERM CURRENT USE OF INSULIN (H): ICD-10-CM

## 2024-06-12 NOTE — TELEPHONE ENCOUNTER
FAX [facility] Refill Request     dapagliflozin (FARXIGA) 10 MG TABS tablet

## 2024-06-14 RX ORDER — DAPAGLIFLOZIN 10 MG/1
10 TABLET, FILM COATED ORAL DAILY
Qty: 30 TABLET | Refills: 0 | Status: SHIPPED | OUTPATIENT
Start: 2024-06-14 | End: 2024-07-16

## 2024-07-16 DIAGNOSIS — E11.65 TYPE 2 DIABETES MELLITUS WITH HYPERGLYCEMIA, WITHOUT LONG-TERM CURRENT USE OF INSULIN (H): ICD-10-CM

## 2024-07-17 RX ORDER — DAPAGLIFLOZIN 10 MG/1
10 TABLET, FILM COATED ORAL DAILY
Qty: 30 TABLET | Refills: 3 | Status: SHIPPED | OUTPATIENT
Start: 2024-07-17

## 2024-07-27 ENCOUNTER — HEALTH MAINTENANCE LETTER (OUTPATIENT)
Age: 59
End: 2024-07-27

## 2024-08-05 DIAGNOSIS — I10 PRIMARY HYPERTENSION: ICD-10-CM

## 2024-08-05 RX ORDER — TRIAMTERENE AND HYDROCHLOROTHIAZIDE 37.5; 25 MG/1; MG/1
1 CAPSULE ORAL DAILY
Qty: 90 CAPSULE | Refills: 1 | Status: SHIPPED | OUTPATIENT
Start: 2024-08-05

## 2024-08-05 NOTE — TELEPHONE ENCOUNTER
Pharmacy calling to get a medication refill on medication(s) attached    triamterene-HCTZ (DYAZIDE) 37.5-25 MG capsule 90 capsule 1 2/5/2024 -- No   Sig - Route: Take 1 capsule by mouth daily - Oral   Sent to pharmacy as: Triamterene-HCTZ 37.5-25 MG Oral Capsule (DYAZIDE)   Class: E-Prescribe   Order: [id number]

## 2024-09-06 DIAGNOSIS — E11.65 TYPE 2 DIABETES MELLITUS WITH HYPERGLYCEMIA, WITHOUT LONG-TERM CURRENT USE OF INSULIN (H): ICD-10-CM

## 2024-10-01 ENCOUNTER — PATIENT OUTREACH (OUTPATIENT)
Dept: CARE COORDINATION | Facility: CLINIC | Age: 59
End: 2024-10-01
Payer: COMMERCIAL

## 2024-10-20 DIAGNOSIS — F51.01 PRIMARY INSOMNIA: ICD-10-CM

## 2024-10-21 RX ORDER — TRAZODONE HYDROCHLORIDE 50 MG/1
TABLET, FILM COATED ORAL
Qty: 180 TABLET | Refills: 1 | Status: SHIPPED | OUTPATIENT
Start: 2024-10-21

## 2024-11-25 ENCOUNTER — MYC REFILL (OUTPATIENT)
Dept: FAMILY MEDICINE | Facility: CLINIC | Age: 59
End: 2024-11-25
Payer: COMMERCIAL

## 2024-11-25 DIAGNOSIS — E11.65 TYPE 2 DIABETES MELLITUS WITH HYPERGLYCEMIA, WITHOUT LONG-TERM CURRENT USE OF INSULIN (H): ICD-10-CM

## 2024-11-26 RX ORDER — DAPAGLIFLOZIN 10 MG/1
10 TABLET, FILM COATED ORAL DAILY
Qty: 30 TABLET | Refills: 3 | Status: SHIPPED | OUTPATIENT
Start: 2024-11-26

## 2025-02-04 ENCOUNTER — LAB (OUTPATIENT)
Dept: LAB | Facility: CLINIC | Age: 60
End: 2025-02-04
Payer: COMMERCIAL

## 2025-02-04 DIAGNOSIS — N18.30 CHRONIC KIDNEY DISEASE, STAGE 3 (H): ICD-10-CM

## 2025-02-04 DIAGNOSIS — E03.9 HYPOTHYROIDISM: ICD-10-CM

## 2025-02-04 DIAGNOSIS — E11.9 TYPE 2 DIABETES MELLITUS (H): Primary | ICD-10-CM

## 2025-02-04 DIAGNOSIS — I10 PRIMARY HYPERTENSION: ICD-10-CM

## 2025-02-04 LAB
ANION GAP SERPL CALCULATED.3IONS-SCNC: 15 MMOL/L (ref 7–15)
BUN SERPL-MCNC: 23.4 MG/DL (ref 8–23)
CALCIUM SERPL-MCNC: 10.1 MG/DL (ref 8.8–10.4)
CHLORIDE SERPL-SCNC: 99 MMOL/L (ref 98–107)
CREAT SERPL-MCNC: 1.1 MG/DL (ref 0.51–0.95)
EGFRCR SERPLBLD CKD-EPI 2021: 58 ML/MIN/1.73M2
EST. AVERAGE GLUCOSE BLD GHB EST-MCNC: 252 MG/DL
GLUCOSE SERPL-MCNC: 254 MG/DL (ref 70–99)
HBA1C MFR BLD: 10.4 % (ref 0–5.6)
HCO3 SERPL-SCNC: 22 MMOL/L (ref 22–29)
HGB BLD-MCNC: 13.5 G/DL (ref 11.7–15.7)
POTASSIUM SERPL-SCNC: 4.5 MMOL/L (ref 3.4–5.3)
SODIUM SERPL-SCNC: 136 MMOL/L (ref 135–145)
TSH SERPL DL<=0.005 MIU/L-ACNC: 1.23 UIU/ML (ref 0.3–4.2)

## 2025-02-04 PROCEDURE — 80048 BASIC METABOLIC PNL TOTAL CA: CPT

## 2025-02-04 PROCEDURE — 85018 HEMOGLOBIN: CPT

## 2025-02-04 PROCEDURE — 83036 HEMOGLOBIN GLYCOSYLATED A1C: CPT

## 2025-02-04 PROCEDURE — 84443 ASSAY THYROID STIM HORMONE: CPT

## 2025-02-04 PROCEDURE — 36415 COLL VENOUS BLD VENIPUNCTURE: CPT

## 2025-02-07 DIAGNOSIS — E11.65 TYPE 2 DIABETES MELLITUS WITH HYPERGLYCEMIA, WITHOUT LONG-TERM CURRENT USE OF INSULIN (H): ICD-10-CM

## 2025-02-07 DIAGNOSIS — E11.9 TYPE 2 DIABETES MELLITUS (H): ICD-10-CM

## 2025-02-07 DIAGNOSIS — I10 PRIMARY HYPERTENSION: ICD-10-CM

## 2025-02-07 NOTE — TELEPHONE ENCOUNTER
Medication Question or Refill    Contacts       Contact Date/Time Type Contact Phone/Fax    02/07/2025 02:22 PM CST Fax (Incoming) Larissa Cowan (Self) [phone] (H)            What medication are you calling about (include dose and sig)?:     dapagliflozin (FARXIGA) 10 MG TABS tablet   glimepiride (AMARYL) 2 MG tablet   triamterene-HCTZ (DYAZIDE) 37.5-25 MG capsule     Preferred Pharmacy:     [facility]/pharmacy #[id number] [address] [address]  Phone: [phone] Fax: [phone]      Controlled Substance Agreement on file:   CSA -- Patient Level:    CSA: None found at the patient level.       Who prescribed the medication?: Dr. Ivonne Lagos    Do you need a refill? Yes    Patient offered an appointment? No patient is scheduled to see covering provider Dr. Rosado on 03/14/25 and PHY is scheduled with PCP on 06/16/25    Do you have any questions or concerns?  Yes: patient shares she cancelled RX for dapagliflozin (FARXIGA) 10 MG TABS tablet as it was too expensive.  Requesting 90 day quantity.

## 2025-02-10 RX ORDER — GLIMEPIRIDE 2 MG/1
2 TABLET ORAL
Qty: 180 TABLET | Refills: 0 | Status: SHIPPED | OUTPATIENT
Start: 2025-02-10

## 2025-02-10 RX ORDER — TRIAMTERENE AND HYDROCHLOROTHIAZIDE 37.5; 25 MG/1; MG/1
1 CAPSULE ORAL DAILY
Qty: 90 CAPSULE | Refills: 0 | Status: SHIPPED | OUTPATIENT
Start: 2025-02-10

## 2025-02-10 RX ORDER — DAPAGLIFLOZIN 10 MG/1
10 TABLET, FILM COATED ORAL DAILY
Qty: 90 TABLET | Refills: 0 | Status: SHIPPED | OUTPATIENT
Start: 2025-02-10

## 2025-02-26 DIAGNOSIS — N18.30 TYPE 2 DIABETES MELLITUS WITH STAGE 3 CHRONIC KIDNEY DISEASE, UNSPECIFIED WHETHER LONG TERM INSULIN USE, UNSPECIFIED WHETHER STAGE 3A OR 3B CKD (H): ICD-10-CM

## 2025-02-26 DIAGNOSIS — E11.22 TYPE 2 DIABETES MELLITUS WITH STAGE 3 CHRONIC KIDNEY DISEASE, UNSPECIFIED WHETHER LONG TERM INSULIN USE, UNSPECIFIED WHETHER STAGE 3A OR 3B CKD (H): ICD-10-CM

## 2025-02-26 RX ORDER — ATORVASTATIN CALCIUM 20 MG/1
20 TABLET, FILM COATED ORAL DAILY
Qty: 90 TABLET | Refills: 3 | OUTPATIENT
Start: 2025-02-26

## 2025-03-17 ENCOUNTER — TELEPHONE (OUTPATIENT)
Dept: FAMILY MEDICINE | Facility: CLINIC | Age: 60
End: 2025-03-17
Payer: COMMERCIAL

## 2025-03-17 DIAGNOSIS — E11.65 TYPE 2 DIABETES MELLITUS WITH HYPERGLYCEMIA, WITHOUT LONG-TERM CURRENT USE OF INSULIN (H): Primary | ICD-10-CM

## 2025-03-19 NOTE — TELEPHONE ENCOUNTER
Retail Pharmacy Prior Authorization Team   Phone: [phone]    PA Initiation    Medication: FREESTYLE JASE 3 PLUS SENSOR MISC  Insurance Company: [employer] - Phone [phone] Fax [phone]  [id number] [place], [place] - [address] [facility]  Filling Pharmacy Phone: [phone]  Filling Pharmacy Fax:    Start Date: 3/19/2025      MUSHTAQ GANDARA (Key: [id number])

## 2025-03-19 NOTE — TELEPHONE ENCOUNTER
PRIOR AUTHORIZATION DENIED    Medication: FREESTYLE JASE 3 PLUS SENSOR MISC  Insurance Company: [employer] - Phone [phone] Fax [phone]  Denial Date: 3/19/2025  Denial Reason(s): MUST TRY/FAIL DEX[employer] CGM PRODUCTS      Appeal Information: IF THE PROVIDER WOULD LIKE TO APPEAL THIS DECISION PLEASE PROVIDE THE PA TEAM WITH A LETTER OF MEDICAL NECESSITY      Patient Notified: NO

## 2025-03-20 RX ORDER — PROCHLORPERAZINE 25 MG/1
SUPPOSITORY RECTAL
Qty: 1 EACH | Refills: 0 | Status: SHIPPED | OUTPATIENT
Start: 2025-03-20

## 2025-03-20 RX ORDER — PROCHLORPERAZINE 25 MG/1
SUPPOSITORY RECTAL
Qty: 3 EACH | Refills: 5 | Status: SHIPPED | OUTPATIENT
Start: 2025-03-20

## 2025-04-08 DIAGNOSIS — E03.9 HYPOTHYROIDISM, UNSPECIFIED TYPE: ICD-10-CM

## 2025-04-08 RX ORDER — LEVOTHYROXINE SODIUM 200 UG/1
200 TABLET ORAL DAILY
Qty: 90 TABLET | Refills: 2 | Status: SHIPPED | OUTPATIENT
Start: 2025-04-08

## 2025-04-16 DIAGNOSIS — N18.30 TYPE 2 DIABETES MELLITUS WITH STAGE 3 CHRONIC KIDNEY DISEASE, UNSPECIFIED WHETHER LONG TERM INSULIN USE, UNSPECIFIED WHETHER STAGE 3A OR 3B CKD (H): ICD-10-CM

## 2025-04-16 DIAGNOSIS — E11.22 TYPE 2 DIABETES MELLITUS WITH STAGE 3 CHRONIC KIDNEY DISEASE, UNSPECIFIED WHETHER LONG TERM INSULIN USE, UNSPECIFIED WHETHER STAGE 3A OR 3B CKD (H): ICD-10-CM

## 2025-04-16 NOTE — TELEPHONE ENCOUNTER
Refill request for Atorvastatin (LIPITOR) 40 MG Tablets.    Too BATOOL Wright on 4/16/2025 at 1:18 PM

## 2025-04-17 RX ORDER — ATORVASTATIN CALCIUM 40 MG/1
40 TABLET, FILM COATED ORAL DAILY
Qty: 60 TABLET | Refills: 0 | Status: SHIPPED | OUTPATIENT
Start: 2025-04-17

## 2025-04-24 ENCOUNTER — PATIENT OUTREACH (OUTPATIENT)
Dept: CARE COORDINATION | Facility: CLINIC | Age: 60
End: 2025-04-24
Payer: COMMERCIAL

## 2025-05-12 DIAGNOSIS — I10 PRIMARY HYPERTENSION: ICD-10-CM

## 2025-05-12 DIAGNOSIS — N18.30 TYPE 2 DIABETES MELLITUS WITH STAGE 3 CHRONIC KIDNEY DISEASE, UNSPECIFIED WHETHER LONG TERM INSULIN USE, UNSPECIFIED WHETHER STAGE 3A OR 3B CKD (H): ICD-10-CM

## 2025-05-12 DIAGNOSIS — E11.22 TYPE 2 DIABETES MELLITUS WITH STAGE 3 CHRONIC KIDNEY DISEASE, UNSPECIFIED WHETHER LONG TERM INSULIN USE, UNSPECIFIED WHETHER STAGE 3A OR 3B CKD (H): ICD-10-CM

## 2025-05-12 RX ORDER — TRIAMTERENE AND HYDROCHLOROTHIAZIDE 37.5; 25 MG/1; MG/1
1 CAPSULE ORAL DAILY
Qty: 90 CAPSULE | Refills: 0 | Status: SHIPPED | OUTPATIENT
Start: 2025-05-12

## 2025-05-12 RX ORDER — ATORVASTATIN CALCIUM 40 MG/1
40 TABLET, FILM COATED ORAL DAILY
Qty: 60 TABLET | Refills: 0 | Status: SHIPPED | OUTPATIENT
Start: 2025-05-12

## 2025-05-20 DIAGNOSIS — F51.01 PRIMARY INSOMNIA: ICD-10-CM

## 2025-05-20 RX ORDER — TRAZODONE HYDROCHLORIDE 50 MG/1
TABLET ORAL
Qty: 180 TABLET | Refills: 1 | Status: SHIPPED | OUTPATIENT
Start: 2025-05-20

## 2025-05-21 ENCOUNTER — PATIENT OUTREACH (OUTPATIENT)
Dept: CARE COORDINATION | Facility: CLINIC | Age: 60
End: 2025-05-21
Payer: COMMERCIAL

## 2025-05-21 DIAGNOSIS — E11.65 TYPE 2 DIABETES MELLITUS WITH HYPERGLYCEMIA, WITHOUT LONG-TERM CURRENT USE OF INSULIN (H): ICD-10-CM

## 2025-05-21 RX ORDER — DAPAGLIFLOZIN 10 MG/1
10 TABLET, FILM COATED ORAL DAILY
Qty: 90 TABLET | Refills: 0 | Status: SHIPPED | OUTPATIENT
Start: 2025-05-21

## 2025-05-21 NOTE — TELEPHONE ENCOUNTER
Medication Question or Refill        What medication are you calling about (include dose and sig)?:    Disp Refills Start End TASIA   dapagliflozin (FARXIGA) 10 MG TABS tablet 90 tablet 0 2/10/2025 -- No   Sig - Route: Take 1 tablet (10 mg) by mouth daily. - Oral   Sent to pharmacy as: Dapagliflozin Propanediol 10 MG Oral Tablet (FARXIGA)   Class: E-Prescribe   Order: [id number]   E-Prescribing Status: Receipt confirmed by pharmacy (2/10/2025  5:22 PM CST)       Preferred Pharmacy:    [facility]/pharmacy #[id number] [address] [address]  Phone: [phone] Fax: [phone]      Controlled Substance Agreement on file:   CSA -- Patient Level:    CSA: None found at the patient level.       Who prescribed the medication?: PCP    Do you need a refill? Yes    Could we send this information to you in [facility] or would you prefer to receive a phone call?:   Patient would prefer a phone call   Okay to leave a detailed message?: Yes at Cell number on file:    Telephone Information:   Mobile [phone]

## 2025-05-22 ENCOUNTER — PATIENT OUTREACH (OUTPATIENT)
Dept: CARE COORDINATION | Facility: CLINIC | Age: 60
End: 2025-05-22
Payer: COMMERCIAL

## 2025-07-01 DIAGNOSIS — N18.30 TYPE 2 DIABETES MELLITUS WITH STAGE 3 CHRONIC KIDNEY DISEASE, UNSPECIFIED WHETHER LONG TERM INSULIN USE, UNSPECIFIED WHETHER STAGE 3A OR 3B CKD (H): ICD-10-CM

## 2025-07-01 DIAGNOSIS — E11.22 TYPE 2 DIABETES MELLITUS WITH STAGE 3 CHRONIC KIDNEY DISEASE, UNSPECIFIED WHETHER LONG TERM INSULIN USE, UNSPECIFIED WHETHER STAGE 3A OR 3B CKD (H): ICD-10-CM

## 2025-08-10 ENCOUNTER — HEALTH MAINTENANCE LETTER (OUTPATIENT)
Age: 60
End: 2025-08-10

## 2025-08-12 DIAGNOSIS — E11.65 UNCONTROLLED DIABETES MELLITUS WITH HYPERGLYCEMIA, WITHOUT LONG-TERM CURRENT USE OF INSULIN (H): ICD-10-CM

## 2025-08-12 DIAGNOSIS — E11.65 TYPE 2 DIABETES MELLITUS WITH HYPERGLYCEMIA, WITHOUT LONG-TERM CURRENT USE OF INSULIN (H): ICD-10-CM

## 2025-08-12 RX ORDER — DAPAGLIFLOZIN 10 MG/1
10 TABLET, FILM COATED ORAL DAILY
Qty: 90 TABLET | Refills: 0 | Status: SHIPPED | OUTPATIENT
Start: 2025-08-12

## 2025-08-14 ENCOUNTER — MYC REFILL (OUTPATIENT)
Dept: FAMILY MEDICINE | Facility: CLINIC | Age: 60
End: 2025-08-14
Payer: COMMERCIAL

## 2025-08-14 DIAGNOSIS — E11.65 TYPE 2 DIABETES MELLITUS WITH HYPERGLYCEMIA, WITHOUT LONG-TERM CURRENT USE OF INSULIN (H): ICD-10-CM

## 2025-08-14 RX ORDER — DAPAGLIFLOZIN 10 MG/1
10 TABLET, FILM COATED ORAL DAILY
Qty: 90 TABLET | Refills: 0 | OUTPATIENT
Start: 2025-08-14

## 2025-08-21 ENCOUNTER — MYC REFILL (OUTPATIENT)
Dept: FAMILY MEDICINE | Facility: CLINIC | Age: 60
End: 2025-08-21
Payer: COMMERCIAL

## 2025-08-21 DIAGNOSIS — E11.9 TYPE 2 DIABETES MELLITUS WITHOUT COMPLICATION, WITHOUT LONG-TERM CURRENT USE OF INSULIN (H): Primary | ICD-10-CM

## 2025-08-21 DIAGNOSIS — I10 PRIMARY HYPERTENSION: ICD-10-CM

## 2025-08-25 RX ORDER — GLIMEPIRIDE 2 MG/1
2 TABLET ORAL
Qty: 180 TABLET | Refills: 0 | Status: SHIPPED | OUTPATIENT
Start: 2025-08-25

## 2025-08-25 RX ORDER — TRIAMTERENE AND HYDROCHLOROTHIAZIDE 37.5; 25 MG/1; MG/1
1 CAPSULE ORAL DAILY
Qty: 90 CAPSULE | Refills: 0 | Status: SHIPPED | OUTPATIENT
Start: 2025-08-25

## 2025-08-26 DIAGNOSIS — N18.30 TYPE 2 DIABETES MELLITUS WITH STAGE 3 CHRONIC KIDNEY DISEASE, UNSPECIFIED WHETHER LONG TERM INSULIN USE, UNSPECIFIED WHETHER STAGE 3A OR 3B CKD (H): ICD-10-CM

## 2025-08-26 DIAGNOSIS — E11.22 TYPE 2 DIABETES MELLITUS WITH STAGE 3 CHRONIC KIDNEY DISEASE, UNSPECIFIED WHETHER LONG TERM INSULIN USE, UNSPECIFIED WHETHER STAGE 3A OR 3B CKD (H): ICD-10-CM

## 2025-08-26 RX ORDER — ATORVASTATIN CALCIUM 40 MG/1
40 TABLET, FILM COATED ORAL DAILY
Qty: 90 TABLET | Refills: 0 | Status: SHIPPED | OUTPATIENT
Start: 2025-08-26

## 2025-08-27 ENCOUNTER — MYC MEDICAL ADVICE (OUTPATIENT)
Dept: FAMILY MEDICINE | Facility: CLINIC | Age: 60
End: 2025-08-27
Payer: COMMERCIAL

## 2025-08-27 DIAGNOSIS — F51.01 PRIMARY INSOMNIA: ICD-10-CM

## 2025-08-27 RX ORDER — TRAZODONE HYDROCHLORIDE 50 MG/1
TABLET ORAL
Qty: 180 TABLET | Refills: 1 | Status: SHIPPED | OUTPATIENT
Start: 2025-08-27

## 2025-08-28 SDOH — HEALTH STABILITY: PHYSICAL HEALTH: ON AVERAGE, HOW MANY DAYS PER WEEK DO YOU ENGAGE IN MODERATE TO STRENUOUS EXERCISE (LIKE A BRISK WALK)?: 2 DAYS

## 2025-08-28 SDOH — HEALTH STABILITY: PHYSICAL HEALTH: ON AVERAGE, HOW MANY MINUTES DO YOU ENGAGE IN EXERCISE AT THIS LEVEL?: 20 MIN

## 2025-08-28 ASSESSMENT — ASTHMA QUESTIONNAIRES
QUESTION_1 LAST FOUR WEEKS HOW MUCH OF THE TIME DID YOUR ASTHMA KEEP YOU FROM GETTING AS MUCH DONE AT WORK, SCHOOL OR AT HOME: NONE OF THE TIME
QUESTION_4 LAST FOUR WEEKS HOW OFTEN HAVE YOU USED YOUR RESCUE INHALER OR NEBULIZER MEDICATION (SUCH AS ALBUTEROL): NOT AT ALL
QUESTION_2 LAST FOUR WEEKS HOW OFTEN HAVE YOU HAD SHORTNESS OF BREATH: NOT AT ALL
ACT_TOTALSCORE: 25
QUESTION_3 LAST FOUR WEEKS HOW OFTEN DID YOUR ASTHMA SYMPTOMS (WHEEZING, COUGHING, SHORTNESS OF BREATH, CHEST TIGHTNESS OR PAIN) WAKE YOU UP AT NIGHT OR EARLIER THAN USUAL IN THE MORNING: NOT AT ALL
QUESTION_5 LAST FOUR WEEKS HOW WOULD YOU RATE YOUR ASTHMA CONTROL: COMPLETELY CONTROLLED

## 2025-09-02 ENCOUNTER — OFFICE VISIT (OUTPATIENT)
Dept: FAMILY MEDICINE | Facility: CLINIC | Age: 60
End: 2025-09-02
Payer: COMMERCIAL

## 2025-09-02 VITALS
HEART RATE: 91 BPM | SYSTOLIC BLOOD PRESSURE: 122 MMHG | HEIGHT: 64 IN | TEMPERATURE: 98.4 F | BODY MASS INDEX: 48.65 KG/M2 | DIASTOLIC BLOOD PRESSURE: 80 MMHG | WEIGHT: 285 LBS | OXYGEN SATURATION: 96 % | RESPIRATION RATE: 18 BRPM

## 2025-09-02 DIAGNOSIS — E11.22 TYPE 2 DIABETES MELLITUS WITH STAGE 3 CHRONIC KIDNEY DISEASE, WITHOUT LONG-TERM CURRENT USE OF INSULIN, UNSPECIFIED WHETHER STAGE 3A OR 3B CKD (H): ICD-10-CM

## 2025-09-02 DIAGNOSIS — N18.30 TYPE 2 DIABETES MELLITUS WITH STAGE 3 CHRONIC KIDNEY DISEASE, WITHOUT LONG-TERM CURRENT USE OF INSULIN, UNSPECIFIED WHETHER STAGE 3A OR 3B CKD (H): ICD-10-CM

## 2025-09-02 DIAGNOSIS — E03.9 HYPOTHYROIDISM, UNSPECIFIED TYPE: ICD-10-CM

## 2025-09-02 DIAGNOSIS — E66.01 MORBID OBESITY (H): ICD-10-CM

## 2025-09-02 DIAGNOSIS — Z00.00 ROUTINE GENERAL MEDICAL EXAMINATION AT A HEALTH CARE FACILITY: Primary | ICD-10-CM

## 2025-09-02 LAB
CHOLEST SERPL-MCNC: 145 MG/DL
CREAT UR-MCNC: 51.2 MG/DL
EST. AVERAGE GLUCOSE BLD GHB EST-MCNC: 171 MG/DL
FASTING STATUS PATIENT QL REPORTED: YES
HBA1C MFR BLD: 7.6 % (ref 0–5.6)
HDLC SERPL-MCNC: 47 MG/DL
LDLC SERPL CALC-MCNC: 61 MG/DL
MICROALBUMIN UR-MCNC: <12 MG/L
MICROALBUMIN/CREAT UR: NORMAL MG/G{CREAT}
NONHDLC SERPL-MCNC: 98 MG/DL
TRIGL SERPL-MCNC: 184 MG/DL

## 2025-09-02 PROCEDURE — 3074F SYST BP LT 130 MM HG: CPT | Performed by: FAMILY MEDICINE

## 2025-09-02 PROCEDURE — 82570 ASSAY OF URINE CREATININE: CPT | Performed by: FAMILY MEDICINE

## 2025-09-02 PROCEDURE — 99214 OFFICE O/P EST MOD 30 MIN: CPT | Mod: 25 | Performed by: FAMILY MEDICINE

## 2025-09-02 PROCEDURE — 90678 RSV VACC PREF BIVALENT IM: CPT | Performed by: FAMILY MEDICINE

## 2025-09-02 PROCEDURE — 90673 RIV3 VACCINE NO PRESERV IM: CPT | Performed by: FAMILY MEDICINE

## 2025-09-02 PROCEDURE — 36415 COLL VENOUS BLD VENIPUNCTURE: CPT | Performed by: FAMILY MEDICINE

## 2025-09-02 PROCEDURE — 80061 LIPID PANEL: CPT | Performed by: FAMILY MEDICINE

## 2025-09-02 PROCEDURE — 99396 PREV VISIT EST AGE 40-64: CPT | Mod: 25 | Performed by: FAMILY MEDICINE

## 2025-09-02 PROCEDURE — 90472 IMMUNIZATION ADMIN EACH ADD: CPT | Performed by: FAMILY MEDICINE

## 2025-09-02 PROCEDURE — 83036 HEMOGLOBIN GLYCOSYLATED A1C: CPT | Performed by: FAMILY MEDICINE

## 2025-09-02 PROCEDURE — 3051F HG A1C>EQUAL 7.0%<8.0%: CPT | Performed by: FAMILY MEDICINE

## 2025-09-02 PROCEDURE — 3079F DIAST BP 80-89 MM HG: CPT | Performed by: FAMILY MEDICINE

## 2025-09-02 PROCEDURE — 82043 UR ALBUMIN QUANTITATIVE: CPT | Performed by: FAMILY MEDICINE

## 2025-09-02 PROCEDURE — 90471 IMMUNIZATION ADMIN: CPT | Performed by: FAMILY MEDICINE

## 2025-09-02 RX ORDER — LEVOTHYROXINE SODIUM 200 UG/1
200 TABLET ORAL DAILY
Qty: 90 TABLET | Refills: 2 | Status: SHIPPED | OUTPATIENT
Start: 2025-09-02

## 2025-09-02 RX ORDER — LEVOTHYROXINE SODIUM 25 UG/1
25 TABLET ORAL DAILY
Qty: 90 TABLET | Refills: 2 | Status: SHIPPED | OUTPATIENT
Start: 2025-09-02

## 2025-09-03 ENCOUNTER — PATIENT OUTREACH (OUTPATIENT)
Dept: CARE COORDINATION | Facility: CLINIC | Age: 60
End: 2025-09-03
Payer: COMMERCIAL